# Patient Record
Sex: FEMALE | Race: BLACK OR AFRICAN AMERICAN | NOT HISPANIC OR LATINO | Employment: UNEMPLOYED | URBAN - METROPOLITAN AREA
[De-identification: names, ages, dates, MRNs, and addresses within clinical notes are randomized per-mention and may not be internally consistent; named-entity substitution may affect disease eponyms.]

---

## 2022-03-16 ENCOUNTER — OFFICE VISIT (OUTPATIENT)
Dept: PRIMARY CARE CLINIC | Facility: CLINIC | Age: 32
End: 2022-03-16
Payer: MEDICAID

## 2022-03-16 ENCOUNTER — LAB VISIT (OUTPATIENT)
Dept: PRIMARY CARE CLINIC | Facility: CLINIC | Age: 32
End: 2022-03-16
Payer: MEDICAID

## 2022-03-16 VITALS
BODY MASS INDEX: 26.99 KG/M2 | WEIGHT: 152.31 LBS | HEIGHT: 63 IN | OXYGEN SATURATION: 99 % | SYSTOLIC BLOOD PRESSURE: 123 MMHG | TEMPERATURE: 98 F | DIASTOLIC BLOOD PRESSURE: 83 MMHG | HEART RATE: 83 BPM

## 2022-03-16 DIAGNOSIS — N39.3 STRESS INCONTINENCE: ICD-10-CM

## 2022-03-16 DIAGNOSIS — R06.09 DYSPNEA ON EXERTION: ICD-10-CM

## 2022-03-16 DIAGNOSIS — Z00.00 ANNUAL PHYSICAL EXAM: Primary | ICD-10-CM

## 2022-03-16 DIAGNOSIS — R05.9 COUGH: ICD-10-CM

## 2022-03-16 DIAGNOSIS — R09.82 POST-NASAL DRIP: ICD-10-CM

## 2022-03-16 DIAGNOSIS — Z00.00 ANNUAL PHYSICAL EXAM: ICD-10-CM

## 2022-03-16 DIAGNOSIS — F41.1 GAD (GENERALIZED ANXIETY DISORDER): ICD-10-CM

## 2022-03-16 LAB
25(OH)D3+25(OH)D2 SERPL-MCNC: 41 NG/ML (ref 30–96)
ALBUMIN SERPL BCP-MCNC: 3.8 G/DL (ref 3.5–5.2)
ALP SERPL-CCNC: 65 U/L (ref 55–135)
ALT SERPL W/O P-5'-P-CCNC: 15 U/L (ref 10–44)
ANION GAP SERPL CALC-SCNC: 8 MMOL/L (ref 8–16)
AST SERPL-CCNC: 19 U/L (ref 10–40)
BASOPHILS # BLD AUTO: 0.06 K/UL (ref 0–0.2)
BASOPHILS NFR BLD: 1 % (ref 0–1.9)
BILIRUB SERPL-MCNC: 0.4 MG/DL (ref 0.1–1)
BUN SERPL-MCNC: 10 MG/DL (ref 6–20)
CALCIUM SERPL-MCNC: 9.8 MG/DL (ref 8.7–10.5)
CHLORIDE SERPL-SCNC: 107 MMOL/L (ref 95–110)
CO2 SERPL-SCNC: 25 MMOL/L (ref 23–29)
CREAT SERPL-MCNC: 0.8 MG/DL (ref 0.5–1.4)
DIFFERENTIAL METHOD: ABNORMAL
EOSINOPHIL # BLD AUTO: 0.2 K/UL (ref 0–0.5)
EOSINOPHIL NFR BLD: 2.6 % (ref 0–8)
ERYTHROCYTE [DISTWIDTH] IN BLOOD BY AUTOMATED COUNT: 14.1 % (ref 11.5–14.5)
EST. GFR  (AFRICAN AMERICAN): >60 ML/MIN/1.73 M^2
EST. GFR  (NON AFRICAN AMERICAN): >60 ML/MIN/1.73 M^2
GLUCOSE SERPL-MCNC: 97 MG/DL (ref 70–110)
HCT VFR BLD AUTO: 38.1 % (ref 37–48.5)
HGB BLD-MCNC: 12.2 G/DL (ref 12–16)
IMM GRANULOCYTES # BLD AUTO: 0.01 K/UL (ref 0–0.04)
IMM GRANULOCYTES NFR BLD AUTO: 0.2 % (ref 0–0.5)
LYMPHOCYTES # BLD AUTO: 3.1 K/UL (ref 1–4.8)
LYMPHOCYTES NFR BLD: 49.5 % (ref 18–48)
MCH RBC QN AUTO: 28.8 PG (ref 27–31)
MCHC RBC AUTO-ENTMCNC: 32 G/DL (ref 32–36)
MCV RBC AUTO: 90 FL (ref 82–98)
MONOCYTES # BLD AUTO: 0.3 K/UL (ref 0.3–1)
MONOCYTES NFR BLD: 5.4 % (ref 4–15)
NEUTROPHILS # BLD AUTO: 2.6 K/UL (ref 1.8–7.7)
NEUTROPHILS NFR BLD: 41.3 % (ref 38–73)
NRBC BLD-RTO: 0 /100 WBC
PLATELET # BLD AUTO: 283 K/UL (ref 150–450)
PMV BLD AUTO: 11.2 FL (ref 9.2–12.9)
POTASSIUM SERPL-SCNC: 4.5 MMOL/L (ref 3.5–5.1)
PROT SERPL-MCNC: 8.1 G/DL (ref 6–8.4)
RBC # BLD AUTO: 4.24 M/UL (ref 4–5.4)
SODIUM SERPL-SCNC: 140 MMOL/L (ref 136–145)
TSH SERPL DL<=0.005 MIU/L-ACNC: 1.62 UIU/ML (ref 0.4–4)
WBC # BLD AUTO: 6.24 K/UL (ref 3.9–12.7)

## 2022-03-16 PROCEDURE — 99204 PR OFFICE/OUTPT VISIT, NEW, LEVL IV, 45-59 MIN: ICD-10-PCS | Mod: S$PBB,,, | Performed by: NURSE PRACTITIONER

## 2022-03-16 PROCEDURE — 84443 ASSAY THYROID STIM HORMONE: CPT | Performed by: NURSE PRACTITIONER

## 2022-03-16 PROCEDURE — 99204 OFFICE O/P NEW MOD 45 MIN: CPT | Mod: S$PBB,,, | Performed by: NURSE PRACTITIONER

## 2022-03-16 PROCEDURE — 1159F MED LIST DOCD IN RCRD: CPT | Mod: CPTII,,, | Performed by: NURSE PRACTITIONER

## 2022-03-16 PROCEDURE — 99999 PR PBB SHADOW E&M-EST. PATIENT-LVL V: ICD-10-PCS | Mod: PBBFAC,,, | Performed by: NURSE PRACTITIONER

## 2022-03-16 PROCEDURE — 3074F SYST BP LT 130 MM HG: CPT | Mod: CPTII,,, | Performed by: NURSE PRACTITIONER

## 2022-03-16 PROCEDURE — 36415 COLL VENOUS BLD VENIPUNCTURE: CPT | Mod: PBBFAC,PN

## 2022-03-16 PROCEDURE — 82306 VITAMIN D 25 HYDROXY: CPT | Performed by: NURSE PRACTITIONER

## 2022-03-16 PROCEDURE — 80053 COMPREHEN METABOLIC PANEL: CPT | Performed by: NURSE PRACTITIONER

## 2022-03-16 PROCEDURE — 3008F BODY MASS INDEX DOCD: CPT | Mod: CPTII,,, | Performed by: NURSE PRACTITIONER

## 2022-03-16 PROCEDURE — 1159F PR MEDICATION LIST DOCUMENTED IN MEDICAL RECORD: ICD-10-PCS | Mod: CPTII,,, | Performed by: NURSE PRACTITIONER

## 2022-03-16 PROCEDURE — 3079F PR MOST RECENT DIASTOLIC BLOOD PRESSURE 80-89 MM HG: ICD-10-PCS | Mod: CPTII,,, | Performed by: NURSE PRACTITIONER

## 2022-03-16 PROCEDURE — 85025 COMPLETE CBC W/AUTO DIFF WBC: CPT | Performed by: NURSE PRACTITIONER

## 2022-03-16 PROCEDURE — 3008F PR BODY MASS INDEX (BMI) DOCUMENTED: ICD-10-PCS | Mod: CPTII,,, | Performed by: NURSE PRACTITIONER

## 2022-03-16 PROCEDURE — 36415 COLL VENOUS BLD VENIPUNCTURE: CPT | Performed by: NURSE PRACTITIONER

## 2022-03-16 PROCEDURE — 99215 OFFICE O/P EST HI 40 MIN: CPT | Mod: PBBFAC,PN | Performed by: NURSE PRACTITIONER

## 2022-03-16 PROCEDURE — 99999 PR PBB SHADOW E&M-EST. PATIENT-LVL V: CPT | Mod: PBBFAC,,, | Performed by: NURSE PRACTITIONER

## 2022-03-16 PROCEDURE — 3079F DIAST BP 80-89 MM HG: CPT | Mod: CPTII,,, | Performed by: NURSE PRACTITIONER

## 2022-03-16 PROCEDURE — 3074F PR MOST RECENT SYSTOLIC BLOOD PRESSURE < 130 MM HG: ICD-10-PCS | Mod: CPTII,,, | Performed by: NURSE PRACTITIONER

## 2022-03-16 RX ORDER — PHENTERMINE HYDROCHLORIDE 37.5 MG/1
37.5 TABLET ORAL DAILY
COMMUNITY
Start: 2022-02-02 | End: 2022-03-14

## 2022-03-16 RX ORDER — LEVOCETIRIZINE DIHYDROCHLORIDE 5 MG/1
5 TABLET, FILM COATED ORAL NIGHTLY
COMMUNITY
Start: 2021-11-26 | End: 2022-03-16 | Stop reason: SDUPTHER

## 2022-03-16 RX ORDER — LEVOCETIRIZINE DIHYDROCHLORIDE 5 MG/1
5 TABLET, FILM COATED ORAL NIGHTLY
Qty: 30 TABLET | Refills: 3 | Status: SHIPPED | OUTPATIENT
Start: 2022-03-16 | End: 2023-05-11 | Stop reason: SDUPTHER

## 2022-03-16 RX ORDER — FLUTICASONE PROPIONATE 50 MCG
1 SPRAY, SUSPENSION (ML) NASAL DAILY
Qty: 18.2 ML | Refills: 0 | Status: SHIPPED | OUTPATIENT
Start: 2022-03-16 | End: 2023-05-11 | Stop reason: SDUPTHER

## 2022-03-16 RX ORDER — ETONOGESTREL AND ETHINYL ESTRADIOL VAGINAL RING .015; .12 MG/D; MG/D
RING VAGINAL
COMMUNITY
Start: 2022-03-08

## 2022-03-16 RX ORDER — BENZONATATE 100 MG/1
100 CAPSULE ORAL 3 TIMES DAILY PRN
Qty: 20 CAPSULE | Refills: 0 | Status: SHIPPED | OUTPATIENT
Start: 2022-03-16 | End: 2022-03-26

## 2022-03-16 RX ORDER — SERTRALINE HYDROCHLORIDE 50 MG/1
50 TABLET, FILM COATED ORAL DAILY
COMMUNITY
Start: 2022-02-14 | End: 2023-05-11 | Stop reason: SDUPTHER

## 2022-03-16 NOTE — PROGRESS NOTES
Subjective:       Patient ID: Betina Jay is a 31 y.o. female.    Chief Complaint: Annual Exam and Cough     HPI     Ms. Jay is a 31 year old female patient that presents to clinic for annual exam and chief complaint of cough.  Past medical history of HPV infection and anxiety.  Past surgical history of  section and cervical biopsy with loop electrode excision.  She has no PCP, she is new to me.  Positive for persistent dry, nonproductive cough for approximately 1 year.  History of marijuana use and bronchitis.  Reports shortness of breath with exertion.  Treatments tried:  Albuterol inhaler with minimal relief in symptoms.  Reports albuterol caused her or lightheadedness.  Denies fever, chills, night sweats, chest pain.    Reports stress incontinence.  History of 2 births--one vaginal delivery and one  section.   Stress incontinence--kegels, 2 births one vaginal / one  section , +sneezing, laughing, coughing.     Diet--no fast foods. Eats primarily oatmeal, egg whites, salmon, and salad.  Limits alcohol, drinks primarily water, occasional coffee.  Currently taking Adipex prescribed by Dr. Peterson.     Exercise- exercises three times weekly--includes weight training, cardiovascular, and calisthenics. Workout is usually one hour at a time.     Dental exam-- bi annual cleanings up to date. Last exam 22.    Eye exam-- rx glasses and contacts-- eye exam up to date.  Last exam 2021. [id number].    Mental health- she is followed by Dr. Bates for anxiety and depression.     Immunizations-Tetanus 2020; COVID-19: completed series and booster;last dose 2022. Influenza received through nursing school 2021.  All other immunizations up to date.     Cancer screenings--Cervical cancer screening due.     LMP 3/2/22--nuva ring.    ROS as listed.   Past Medical History:   Diagnosis Date    Anxiety     History of HPV infection       Past Surgical History:   Procedure Laterality  "Date    CERVICAL BIOPSY  W/ LOOP ELECTRODE EXCISION       SECTION        Family History   Problem Relation Age of Onset    Hypertension Mother     Hyperlipidemia Father     Heart disease Paternal Grandmother     Cancer Neg Hx     Stroke Neg Hx       Review of patient's allergies indicates:   Allergen Reactions    Penicillins Hives     Review of Systems   Respiratory: Positive for cough (non productive, dry-worse at night) and shortness of breath (w/ exertion ).         Labored breathing when lying down    Psychiatric/Behavioral: Positive for sleep disturbance.       Objective:       Vitals:    22 1135   BP: 123/83   BP Location: Left arm   Patient Position: Sitting   BP Method: Medium (Automatic)   Pulse: 83   Temp: 98.4 °F (36.9 °C)   TempSrc: Oral   SpO2: 99%   Weight: 69.1 kg (152 lb 5.4 oz)   Height: 5' 3" (1.6 m)     Physical Exam  Vitals and nursing note reviewed.   Constitutional:       General: She is not in acute distress.     Appearance: Normal appearance. She is normal weight. She is not toxic-appearing.   HENT:      Head: Normocephalic and atraumatic.      Nose: Rhinorrhea present.      Mouth/Throat:      Mouth: Mucous membranes are moist.      Pharynx: Posterior oropharyngeal erythema present. No oropharyngeal exudate.      Comments: Mask in place  Eyes:      Conjunctiva/sclera: Conjunctivae normal.      Pupils: Pupils are equal, round, and reactive to light.   Cardiovascular:      Rate and Rhythm: Normal rate and regular rhythm.      Heart sounds: Normal heart sounds. No murmur heard.    No gallop.   Pulmonary:      Effort: Pulmonary effort is normal. No respiratory distress.      Breath sounds: Normal breath sounds.   Abdominal:      General: Bowel sounds are normal. There is no distension.      Palpations: Abdomen is soft.   Musculoskeletal:         General: Normal range of motion.      Cervical back: Normal range of motion.   Lymphadenopathy:      Cervical: No cervical " adenopathy.   Skin:     General: Skin is warm and dry.      Capillary Refill: Capillary refill takes less than 2 seconds.   Neurological:      Mental Status: She is alert and oriented to person, place, and time.      Gait: Gait normal.   Psychiatric:         Mood and Affect: Mood normal.         Behavior: Behavior normal.         Lab Results   Component Value Date    WBC 6.24 03/16/2022    HGB 12.2 03/16/2022    HCT 38.1 03/16/2022     03/16/2022    ALT 15 03/16/2022    AST 19 03/16/2022     03/16/2022    K 4.5 03/16/2022     03/16/2022    CREATININE 0.8 03/16/2022    BUN 10 03/16/2022    CO2 25 03/16/2022    TSH 1.624 03/16/2022      Assessment:       1. Annual physical exam    2. Post-nasal drip    3. Stress incontinence    4. BRIDGET (generalized anxiety disorder)    5. Dyspnea on exertion    6. Cough        Plan:       Annual physical exam  -     CBC Auto Differential; Future; Expected date: 03/16/2022  -     Comprehensive Metabolic Panel; Future; Expected date: 03/16/2022  -     Vitamin D; Future; Expected date: 03/16/2022  -     TSH; Future; Expected date: 03/16/2022    Post-nasal drip  -     fluticasone propionate (FLONASE) 50 mcg/actuation nasal spray; 1 spray (50 mcg total) by Each Nostril route once daily.  Dispense: 18.2 mL; Refill: 0  -     levocetirizine (XYZAL) 5 MG tablet; Take 1 tablet (5 mg total) by mouth every evening.  Dispense: 30 tablet; Refill: 3    Stress incontinence  -     Ambulatory referral/consult to Urogynecology; Future; Expected date: 03/23/2022  -     Ambulatory referral/consult to Physical/Occupational Therapy; Future; Expected date: 03/16/2022    BRIDGET (generalized anxiety disorder)    Dyspnea on exertion  -     X-Ray Chest PA And Lateral; Future; Expected date: 03/16/2022    Cough  -     benzonatate (TESSALON) 100 MG capsule; Take 1 capsule (100 mg total) by mouth 3 (three) times daily as needed for Cough.  Dispense: 20 capsule; Refill: 0      Medication List with  Changes/Refills   New Medications    BENZONATATE (TESSALON) 100 MG CAPSULE    Take 1 capsule (100 mg total) by mouth 3 (three) times daily as needed for Cough.    FLUTICASONE PROPIONATE (FLONASE) 50 MCG/ACTUATION NASAL SPRAY    1 spray (50 mcg total) by Each Nostril route once daily.   Current Medications    ETONOGESTREL-ETHINYL ESTRADIOL (NUVARING) 0.12-0.015 MG/24 HR VAGINAL RING    Place vaginally.    PHENTERMINE (ADIPEX-P) 37.5 MG TABLET    Take 37.5 mg by mouth once daily.    SERTRALINE (ZOLOFT) 50 MG TABLET    Take 50 mg by mouth once daily.   Changed and/or Refilled Medications    Modified Medication Previous Medication    LEVOCETIRIZINE (XYZAL) 5 MG TABLET levocetirizine (XYZAL) 5 MG tablet       Take 1 tablet (5 mg total) by mouth every evening.    Take 5 mg by mouth nightly.       Follow up in about 4 weeks (around 4/13/2022), or establish care, for with Dr. Sousa.     Samira Sahu, DNP, APRN, FNP-C

## 2022-03-29 PROBLEM — M62.81 MUSCLE WEAKNESS: Status: ACTIVE | Noted: 2022-03-29

## 2022-03-29 PROBLEM — R27.9 LACK OF COORDINATION: Status: ACTIVE | Noted: 2022-03-29

## 2022-04-14 ENCOUNTER — OFFICE VISIT (OUTPATIENT)
Dept: PRIMARY CARE CLINIC | Facility: CLINIC | Age: 32
End: 2022-04-14
Payer: MEDICAID

## 2022-04-14 VITALS
BODY MASS INDEX: 26.86 KG/M2 | HEART RATE: 77 BPM | DIASTOLIC BLOOD PRESSURE: 70 MMHG | TEMPERATURE: 98 F | SYSTOLIC BLOOD PRESSURE: 112 MMHG | WEIGHT: 151.56 LBS | RESPIRATION RATE: 18 BRPM | HEIGHT: 63 IN | OXYGEN SATURATION: 99 %

## 2022-04-14 DIAGNOSIS — R05.3 CHRONIC COUGH: Primary | ICD-10-CM

## 2022-04-14 DIAGNOSIS — Z11.59 NEED FOR HEPATITIS C SCREENING TEST: ICD-10-CM

## 2022-04-14 DIAGNOSIS — L21.9 SEBORRHEIC DERMATITIS: ICD-10-CM

## 2022-04-14 DIAGNOSIS — J30.89 NON-SEASONAL ALLERGIC RHINITIS DUE TO OTHER ALLERGIC TRIGGER: ICD-10-CM

## 2022-04-14 DIAGNOSIS — N39.3 SUI (STRESS URINARY INCONTINENCE, FEMALE): ICD-10-CM

## 2022-04-14 PROCEDURE — 99999 PR PBB SHADOW E&M-EST. PATIENT-LVL III: ICD-10-PCS | Mod: PBBFAC,,, | Performed by: FAMILY MEDICINE

## 2022-04-14 PROCEDURE — 99214 OFFICE O/P EST MOD 30 MIN: CPT | Mod: S$PBB,,, | Performed by: FAMILY MEDICINE

## 2022-04-14 PROCEDURE — 3074F PR MOST RECENT SYSTOLIC BLOOD PRESSURE < 130 MM HG: ICD-10-PCS | Mod: CPTII,,, | Performed by: FAMILY MEDICINE

## 2022-04-14 PROCEDURE — 99999 PR PBB SHADOW E&M-EST. PATIENT-LVL III: CPT | Mod: PBBFAC,,, | Performed by: FAMILY MEDICINE

## 2022-04-14 PROCEDURE — 3008F PR BODY MASS INDEX (BMI) DOCUMENTED: ICD-10-PCS | Mod: CPTII,,, | Performed by: FAMILY MEDICINE

## 2022-04-14 PROCEDURE — 3078F DIAST BP <80 MM HG: CPT | Mod: CPTII,,, | Performed by: FAMILY MEDICINE

## 2022-04-14 PROCEDURE — 99214 PR OFFICE/OUTPT VISIT, EST, LEVL IV, 30-39 MIN: ICD-10-PCS | Mod: S$PBB,,, | Performed by: FAMILY MEDICINE

## 2022-04-14 PROCEDURE — 3078F PR MOST RECENT DIASTOLIC BLOOD PRESSURE < 80 MM HG: ICD-10-PCS | Mod: CPTII,,, | Performed by: FAMILY MEDICINE

## 2022-04-14 PROCEDURE — 3008F BODY MASS INDEX DOCD: CPT | Mod: CPTII,,, | Performed by: FAMILY MEDICINE

## 2022-04-14 PROCEDURE — 99213 OFFICE O/P EST LOW 20 MIN: CPT | Mod: PBBFAC,PN | Performed by: FAMILY MEDICINE

## 2022-04-14 PROCEDURE — 3074F SYST BP LT 130 MM HG: CPT | Mod: CPTII,,, | Performed by: FAMILY MEDICINE

## 2022-04-14 RX ORDER — OXYMETAZOLINE HCL 0.05 %
2 SPRAY, NON-AEROSOL (ML) NASAL 2 TIMES DAILY
Qty: 15 ML | Refills: 0 | Status: SHIPPED | OUTPATIENT
Start: 2022-04-14 | End: 2022-04-17

## 2022-04-14 RX ORDER — KETOCONAZOLE 20 MG/ML
SHAMPOO, SUSPENSION TOPICAL
Qty: 120 ML | Refills: 2 | Status: SHIPPED | OUTPATIENT
Start: 2022-04-14

## 2022-04-14 NOTE — PROGRESS NOTES
"Subjective:       Patient ID: Betina Jay is a 31 y.o. female.    Chief Complaint: Establish Care (Prior visitation for stress incontinence, was referred to a specialist. Various concerns and nonspecific. C/o consistent cough, allergies. Inquire regarding labs and results review; c/o dermatitis. )    30 yo F pt, new to me, with PMH significant for anxiety and h/o HPV and LEEP in . She presents to [facility]. She saw NP Samira Sahu for an annual exam 3/16/2022. She has several complaints today.     1. Cough x 1 year: Cough is dry; stimulated from sensation of post-nasal.No associated wheezing. + shortness of breath with walking/taking stairs. Often has "coughing spells" at night. --Advised this can be allergies and started on Flonase + Xyzal. Symptoms have improved with flonase; taking xyzal on prn basis. Has family history of atopic dermatitis. Sister has asthma. She had a normal CXR 3/16/2022. States she had spirometry done in early 20s--didn't understand results, but believes she was rx'd albuterol. No reflux symptoms.     2.Stress incontinence: Reports leakage of urine with cough/laugh/sneezing x few years. Reports accidental release of streams of urine. She had - and  . Symptoms became more prominent after delivery of 3 year of son.  + h/o constipation--started taking metamucil 2 months ago with improvement. Drinks 0.5-1 cup of coffee daily. Denies consumption of sugary drinks. No heavy lifting.    3.Has h/o seborrheic dermatitis: Requesting rx'd shampoo for flaking around hairline. Sometimes itchy. Flares after washing hair. Often with rash to nasolabial folds, mouth, and forehead      Past Medical History:   Diagnosis Date    Anxiety     History of HPV infection     Other depression        Patient Active Problem List   Diagnosis    Muscle weakness    Lack of coordination    LUNA (stress urinary incontinence, female)    Seborrheic dermatitis    Non-seasonal " "allergic rhinitis    Chronic cough       Past Surgical History:   Procedure Laterality Date    CERVICAL BIOPSY  W/ LOOP ELECTRODE EXCISION       SECTION         Family History   Problem Relation Age of Onset    Hypertension Mother     Hyperlipidemia Father     Heart disease Paternal Grandmother     Cancer Neg Hx     Stroke Neg Hx        Social History     Tobacco Use   Smoking Status Never Smoker   Smokeless Tobacco Never Used       Medications have been reviewed and reconciled.     Review of patient's allergies indicates:   Allergen Reactions    Penicillins Hives        Review of Systems   Constitutional: Negative for activity change, appetite change, chills, fatigue, fever and unexpected weight change.   HENT: Negative for congestion, sinus pressure, sinus pain, sneezing, sore throat and trouble swallowing.    Eyes: Negative for redness, itching and visual disturbance.   Respiratory: Positive for cough and shortness of breath. Negative for chest tightness and wheezing.    Cardiovascular: Negative for chest pain, palpitations and leg swelling.   Gastrointestinal: Negative for abdominal pain, constipation, diarrhea, nausea and vomiting.   Genitourinary: Negative for dysuria, frequency, hematuria, pelvic pain, urgency, vaginal bleeding and vaginal discharge.   Musculoskeletal: Negative for arthralgias.   Skin: Positive for rash.   Neurological: Negative for dizziness, syncope and headaches.   Psychiatric/Behavioral: Negative for dysphoric mood and suicidal ideas. The patient is not nervous/anxious.          Objective:        /70 (BP Location: Right arm, Patient Position: Sitting, BP Method: Small (Automatic))   Pulse 77   Temp 98.4 °F (36.9 °C) (Oral)   Resp 18   Ht 5' 3" (1.6 m)   Wt 68.8 kg (151 lb 9.1 oz)   SpO2 99%   BMI 26.85 kg/m²      Physical Exam  Constitutional:       General: She is not in acute distress.     Appearance: She is well-developed.   HENT:      Head: Normocephalic " and atraumatic.      Right Ear: Tympanic membrane, ear canal and external ear normal.      Left Ear: Tympanic membrane, ear canal and external ear normal.      Mouth/Throat:      Pharynx: No oropharyngeal exudate or posterior oropharyngeal erythema.      Comments: + minimal cobblestoning  Eyes:      General: No scleral icterus.     Extraocular Movements: Extraocular movements intact.      Conjunctiva/sclera: Conjunctivae normal.   Neck:      Thyroid: No thyromegaly.   Cardiovascular:      Rate and Rhythm: Normal rate and regular rhythm.      Heart sounds: Normal heart sounds. No murmur heard.    No friction rub. No gallop.   Pulmonary:      Effort: Pulmonary effort is normal.      Breath sounds: Normal breath sounds. No wheezing or rales.   Abdominal:      General: Abdomen is flat. There is no distension.      Palpations: Abdomen is soft. There is no mass.      Tenderness: There is no abdominal tenderness.   Musculoskeletal:         General: Normal range of motion.      Cervical back: Normal range of motion and neck supple.      Right lower leg: No edema.      Left lower leg: No edema.   Lymphadenopathy:      Cervical: No cervical adenopathy.   Skin:     General: Skin is warm and dry.      Findings: No erythema or rash.      Comments: + mild erythema and scale to anterior hairline   Neurological:      Mental Status: She is alert and oriented to person, place, and time.      Cranial Nerves: No cranial nerve deficit.   Psychiatric:         Mood and Affect: Mood normal.         Behavior: Behavior normal.           Assessment:       1. Chronic cough    2. Non-seasonal allergic rhinitis due to other allergic trigger    3. LUNA (stress urinary incontinence, female)    4. Seborrheic dermatitis        Plan:       Betina was seen today for establish care.    Diagnoses and all orders for this visit:    Chronic cough  -     Complete PFT w/ bronchodilator; Future    Non-seasonal allergic rhinitis due to other allergic trigger  -      oxymetazoline (AFRIN, OXYMETAZOLINE,) 0.05 % nasal spray; 2 sprays by Nasal route 2 (two) times daily. for 3 days    LUNA (stress urinary incontinence, female)  -     Ambulatory referral/consult to Physical/Occupational Therapy; Future    Seborrheic dermatitis  -     ketoconazole (NIZORAL) 2 % shampoo; Apply topically twice a week.      Cough   --Dry cough present x 1 year.   --Mild improvement with Flonase/Xyzal   --I am concerned for reactive airway disease  --Send for PFTs  --Consider singulair and/or albuterol pending results     AR  --Continue Flonase daily  --Advised Xyzal hs   --Afrin x 3 days given turbinate hypertrophy on exam.    LUNA   --Anticipate appt with Urogyn 05/13/2022  --Advised avoidance of constipation; limit sugary and caffeinated beverages; routine physical activity and maintenance of healthy weight encouraged   --Likely will benefit from pelvic floor therapy. Will place referral     Seb Derm   --Scalp mainly affected   --Start Ketoconazole 2% shampoo twice weekly x 6-8 weeks    Anxiety/Depression   Following with Dr. Bates for anxiety and depression.   Continue Zoloft 25 mgd aily       Has upcoming pap appt with [facility]   Needs LP, Hep C, HIV screens with next labs   Considering HPV vaccine if covered by insurance    F/U plan pending PFTs    I spent a total of 40 minutes on the day of the visit.This includes face to face time and non-face to face time preparing to see the patient (eg, review of tests), obtaining and/or reviewing separately obtained history, documenting clinical information in the electronic or other health record, independently interpreting results and communicating results to the patient/family/caregiver, or care coordinator.

## 2022-04-22 ENCOUNTER — HOSPITAL ENCOUNTER (OUTPATIENT)
Dept: PULMONOLOGY | Facility: CLINIC | Age: 32
Discharge: HOME | End: 2022-04-22
Payer: MEDICAID

## 2022-04-22 DIAGNOSIS — R05.3 CHRONIC COUGH: ICD-10-CM

## 2022-04-22 LAB
DLCO ADJ PRE: 26.25 ML/(MIN*MMHG) (ref 20.64–32.1)
DLCO SINGLE BREATH LLN: 20.64
DLCO SINGLE BREATH PRE REF: 95.7 %
DLCO SINGLE BREATH REF: 26.37
DLCOC SBVA LLN: 3.87
DLCOC SBVA PRE REF: 110.6 %
DLCOC SBVA REF: 5.53
DLCOC SINGLE BREATH LLN: 20.64
DLCOC SINGLE BREATH PRE REF: 99.6 %
DLCOC SINGLE BREATH REF: 26.37
DLCOCSBVAULN: 7.19
DLCOCSINGLEBREATHULN: 32.1
DLCOSINGLEBREATHULN: 32.1
DLCOVA LLN: 3.87
DLCOVA PRE REF: 106.3 %
DLCOVA PRE: 5.88 ML/(MIN*MMHG*L) (ref 3.87–7.19)
DLCOVA REF: 5.53
DLCOVAULN: 7.19
DLVAADJ PRE: 6.11 ML/(MIN*MMHG*L) (ref 3.87–7.19)
ERV LLN: -16448.78
ERV PRE REF: 88.5 %
ERV REF: 1.22
ERVULN: ABNORMAL
FEF 25 75 LLN: 1.75
FEF 25 75 PRE REF: 98.6 %
FEF 25 75 REF: 3.04
FET100 CHG: 5.6 %
FEV05 LLN: 1.34
FEV05 REF: 2.2
FEV1 CHG: 3.5 %
FEV1 FVC LLN: 74
FEV1 FVC PRE REF: 97.6 %
FEV1 FVC REF: 85
FEV1 LLN: 2.08
FEV1 PRE REF: 104.3 %
FEV1 REF: 2.66
FEV1 VOL CHG: 0.1
FRCPLETH LLN: 1.79
FRCPLETH PREREF: 75.7 %
FRCPLETH REF: 2.62
FRCPLETHULN: 3.44
FVC CHG: -1.1 %
FVC LLN: 2.45
FVC PRE REF: 106.5 %
FVC REF: 3.13
FVC VOL CHG: -0.04
IVC PRE: 3.35 L (ref 2.45–3.84)
IVC SINGLE BREATH LLN: 2.45
IVC SINGLE BREATH PRE REF: 107.1 %
IVC SINGLE BREATH REF: 3.13
IVCSINGLEBREATHULN: 3.84
LLN IC: -16447.79
PEF LLN: 4.73
PEF PRE REF: 100.4 %
PEF REF: 6.67
PHYSICIAN COMMENT: ABNORMAL
POST FEF 25 75: 3.65 L/S (ref 1.75–4.61)
POST FET 100: 6.75 SEC
POST FEV1 FVC: 87.09 % (ref 74.05–94.12)
POST FEV1: 2.87 L (ref 2.08–3.22)
POST FEV5: 2.32 L (ref 1.34–3.06)
POST FVC: 3.3 L (ref 2.45–3.84)
POST PEF: 6.6 L/S (ref 4.73–8.62)
PRE DLCO: 25.23 ML/(MIN*MMHG) (ref 20.64–32.1)
PRE ERV: 1.08 L (ref -16448.78–16451.22)
PRE FEF 25 75: 3 L/S (ref 1.75–4.61)
PRE FET 100: 6.39 SEC
PRE FEV05 REF: 99.8 %
PRE FEV1 FVC: 83.2 % (ref 74.05–94.12)
PRE FEV1: 2.78 L (ref 2.08–3.22)
PRE FEV5: 2.2 L (ref 1.34–3.06)
PRE FRC PL: 1.98 L (ref 1.79–3.44)
PRE FVC: 3.34 L (ref 2.45–3.84)
PRE IC: 2.27 L (ref -16447.79–16452.21)
PRE PEF: 6.7 L/S (ref 4.73–8.62)
PRE REF IC: 102.8 %
PRE RV: 0.9 L (ref 0.82–1.97)
PRE TLC: 4.25 L (ref 3.78–5.76)
RAW PRE REF: 151.6 %
RAW PRE: 4.64 CMH2O*S/L (ref 3.06–3.06)
RAW REF: 3.06
REF IC: 2.21
RV LLN: 0.82
RV PRE REF: 64.6 %
RV REF: 1.39
RVTLC LLN: 20
RVTLC PRE REF: 71.7 %
RVTLC PRE: 21.14 % (ref 19.91–39.09)
RVTLC REF: 30
RVTLCULN: 39
RVULN: 1.97
SGAW PRE REF: 86.2 %
SGAW PRE: 0.09 1/(CMH2O*S) (ref 0.1–0.1)
SGAW REF: 0.1
TLC LLN: 3.78
TLC PRE REF: 89.1 %
TLC REF: 4.77
TLC ULN: 5.76
ULN IC: ABNORMAL
VA PRE: 4.29 L (ref 4.62–4.62)
VA SINGLE BREATH PRE REF: 92.9 %
VA SINGLE BREATH REF: 4.62
VC LLN: 2.45
VC PRE REF: 107.1 %
VC PRE: 3.35 L (ref 2.45–3.84)
VC REF: 3.13
VC ULN: 3.84

## 2022-04-22 PROCEDURE — 94060 EVALUATION OF WHEEZING: CPT | Mod: PBBFAC | Performed by: INTERNAL MEDICINE

## 2022-04-22 PROCEDURE — 94729 DIFFUSING CAPACITY: CPT | Mod: PBBFAC | Performed by: INTERNAL MEDICINE

## 2022-04-22 PROCEDURE — 94060 PR EVAL OF BRONCHOSPASM: ICD-10-PCS | Mod: 26,S$PBB,, | Performed by: INTERNAL MEDICINE

## 2022-04-22 PROCEDURE — 94726 PLETHYSMOGRAPHY LUNG VOLUMES: CPT | Mod: 26,S$PBB,, | Performed by: INTERNAL MEDICINE

## 2022-04-22 PROCEDURE — 94729 DIFFUSING CAPACITY: CPT | Mod: 26,S$PBB,, | Performed by: INTERNAL MEDICINE

## 2022-04-22 PROCEDURE — 94729 PR C02/MEMBANE DIFFUSE CAPACITY: ICD-10-PCS | Mod: 26,S$PBB,, | Performed by: INTERNAL MEDICINE

## 2022-04-22 PROCEDURE — 94726 PULM FUNCT TST PLETHYSMOGRAP: ICD-10-PCS | Mod: 26,S$PBB,, | Performed by: INTERNAL MEDICINE

## 2022-04-22 PROCEDURE — 94726 PLETHYSMOGRAPHY LUNG VOLUMES: CPT | Mod: PBBFAC | Performed by: INTERNAL MEDICINE

## 2022-04-22 PROCEDURE — 94060 EVALUATION OF WHEEZING: CPT | Mod: 26,S$PBB,, | Performed by: INTERNAL MEDICINE

## 2022-04-24 ENCOUNTER — PATIENT MESSAGE (OUTPATIENT)
Dept: PRIMARY CARE CLINIC | Facility: CLINIC | Age: 32
End: 2022-04-24
Payer: MEDICAID

## 2022-04-24 DIAGNOSIS — R05.3 CHRONIC COUGH: Primary | ICD-10-CM

## 2022-04-24 RX ORDER — ALBUTEROL SULFATE 90 UG/1
2 AEROSOL, METERED RESPIRATORY (INHALATION) EVERY 6 HOURS PRN
Qty: 18 G | Refills: 0 | Status: SHIPPED | OUTPATIENT
Start: 2022-04-24 | End: 2022-08-24

## 2022-04-24 RX ORDER — MONTELUKAST SODIUM 10 MG/1
10 TABLET ORAL NIGHTLY
Qty: 30 TABLET | Refills: 1 | Status: SHIPPED | OUTPATIENT
Start: 2022-04-24 | End: 2022-05-24

## 2022-04-25 ENCOUNTER — PATIENT MESSAGE (OUTPATIENT)
Dept: PRIMARY CARE CLINIC | Facility: CLINIC | Age: 32
End: 2022-04-25
Payer: MEDICAID

## 2022-06-07 ENCOUNTER — PATIENT MESSAGE (OUTPATIENT)
Dept: PRIMARY CARE CLINIC | Facility: CLINIC | Age: 32
End: 2022-06-07
Payer: MEDICAID

## 2024-04-25 ENCOUNTER — CLINICAL SUPPORT (OUTPATIENT)
Dept: OBSTETRICS AND GYNECOLOGY | Facility: CLINIC | Age: 34
End: 2024-04-25
Payer: COMMERCIAL

## 2024-04-25 DIAGNOSIS — N91.2 AMENORRHEA: Primary | ICD-10-CM

## 2024-04-30 ENCOUNTER — TELEPHONE (OUTPATIENT)
Dept: OBSTETRICS AND GYNECOLOGY | Facility: CLINIC | Age: 34
End: 2024-04-30
Payer: COMMERCIAL

## 2024-04-30 ENCOUNTER — CLINICAL SUPPORT (OUTPATIENT)
Dept: OBSTETRICS AND GYNECOLOGY | Facility: CLINIC | Age: 34
End: 2024-04-30
Payer: COMMERCIAL

## 2024-04-30 ENCOUNTER — PATIENT MESSAGE (OUTPATIENT)
Dept: OBSTETRICS AND GYNECOLOGY | Facility: CLINIC | Age: 34
End: 2024-04-30

## 2024-04-30 DIAGNOSIS — N91.2 AMENORRHEA: Primary | ICD-10-CM

## 2024-04-30 PROCEDURE — 99999 PR PBB SHADOW E&M-EST. PATIENT-LVL I: CPT | Mod: PBBFAC,,,

## 2024-04-30 NOTE — PROGRESS NOTES
Spoke with patient for a total of about 10 minutes during phone call as we were not able to connect for virtual visit. Patient was guided through expectations of care during pregnancy.  Pregnancy confirmation, dating u/s & first routine OB appts were already scheduled during previous appt.  Brief education provided & questions answered. Encouraged to send message or call office with any questions/concerns. Verbalized understanding.     Discussed with pt:    C/o n/v-discussed safe options-reports that she has taken zofran couple of times  Referred to [email or web address] for Preg A to Z guide & class schedule

## 2024-05-21 ENCOUNTER — NURSE TRIAGE (OUTPATIENT)
Dept: ADMINISTRATIVE | Facility: CLINIC | Age: 34
End: 2024-05-21
Payer: COMMERCIAL

## 2024-05-21 ENCOUNTER — TELEPHONE (OUTPATIENT)
Dept: OBSTETRICS AND GYNECOLOGY | Facility: CLINIC | Age: 34
End: 2024-05-21
Payer: COMMERCIAL

## 2024-05-21 ENCOUNTER — HOSPITAL ENCOUNTER (OUTPATIENT)
Facility: OTHER | Age: 34
Discharge: HOME | End: 2024-05-22
Attending: EMERGENCY MEDICINE | Admitting: OBSTETRICS & GYNECOLOGY
Payer: COMMERCIAL

## 2024-05-21 DIAGNOSIS — O36.80X0 PREGNANCY, LOCATION UNKNOWN: ICD-10-CM

## 2024-05-21 DIAGNOSIS — O46.90 VAGINAL BLEEDING IN PREGNANCY: ICD-10-CM

## 2024-05-21 DIAGNOSIS — Z98.890 STATUS POST DILATION AND CURETTAGE: Primary | ICD-10-CM

## 2024-05-21 LAB
ABO + RH BLD: NORMAL
ABO + RH BLD: NORMAL
ALBUMIN SERPL BCP-MCNC: 3.7 G/DL (ref 3.5–5.2)
ALP SERPL-CCNC: 57 U/L (ref 55–135)
ALT SERPL W/O P-5'-P-CCNC: 13 U/L (ref 10–44)
ANION GAP SERPL CALC-SCNC: 8 MMOL/L (ref 8–16)
AST SERPL-CCNC: 14 U/L (ref 10–40)
BACTERIA #/AREA URNS HPF: ABNORMAL /HPF
BASOPHILS # BLD AUTO: 0.03 K/UL (ref 0–0.2)
BASOPHILS # BLD AUTO: 0.04 K/UL (ref 0–0.2)
BASOPHILS NFR BLD: 0.4 % (ref 0–1.9)
BASOPHILS NFR BLD: 0.5 % (ref 0–1.9)
BILIRUB SERPL-MCNC: 0.3 MG/DL (ref 0.1–1)
BILIRUB UR QL STRIP: NEGATIVE
BLD GP AB SCN CELLS X3 SERPL QL: NORMAL
BUN SERPL-MCNC: 13 MG/DL (ref 6–20)
CALCIUM SERPL-MCNC: 9 MG/DL (ref 8.7–10.5)
CHLORIDE SERPL-SCNC: 104 MMOL/L (ref 95–110)
CLARITY UR: CLEAR
CO2 SERPL-SCNC: 24 MMOL/L (ref 23–29)
COLOR UR: ABNORMAL
CREAT SERPL-MCNC: 0.7 MG/DL (ref 0.5–1.4)
DIFFERENTIAL METHOD BLD: ABNORMAL
DIFFERENTIAL METHOD BLD: ABNORMAL
EOSINOPHIL # BLD AUTO: 0.3 K/UL (ref 0–0.5)
EOSINOPHIL # BLD AUTO: 0.3 K/UL (ref 0–0.5)
EOSINOPHIL NFR BLD: 3.1 % (ref 0–8)
EOSINOPHIL NFR BLD: 3.9 % (ref 0–8)
ERYTHROCYTE [DISTWIDTH] IN BLOOD BY AUTOMATED COUNT: 14.5 % (ref 11.5–14.5)
ERYTHROCYTE [DISTWIDTH] IN BLOOD BY AUTOMATED COUNT: 14.6 % (ref 11.5–14.5)
EST. GFR  (NO RACE VARIABLE): >60 ML/MIN/1.73 M^2
GLUCOSE SERPL-MCNC: 100 MG/DL (ref 70–110)
GLUCOSE UR QL STRIP: NEGATIVE
HCG INTACT+B SERPL-ACNC: 3221 MIU/ML
HCT VFR BLD AUTO: 27.9 % (ref 37–48.5)
HCT VFR BLD AUTO: 32.7 % (ref 37–48.5)
HCV AB SERPL QL IA: NEGATIVE
HGB BLD-MCNC: 10.8 G/DL (ref 12–16)
HGB BLD-MCNC: 9.2 G/DL (ref 12–16)
HGB UR QL STRIP: ABNORMAL
HIV 1+2 AB+HIV1 P24 AG SERPL QL IA: NEGATIVE
IMM GRANULOCYTES # BLD AUTO: 0.01 K/UL (ref 0–0.04)
IMM GRANULOCYTES # BLD AUTO: 0.02 K/UL (ref 0–0.04)
IMM GRANULOCYTES NFR BLD AUTO: 0.1 % (ref 0–0.5)
IMM GRANULOCYTES NFR BLD AUTO: 0.2 % (ref 0–0.5)
KETONES UR QL STRIP: NEGATIVE
LEUKOCYTE ESTERASE UR QL STRIP: NEGATIVE
LYMPHOCYTES # BLD AUTO: 3.2 K/UL (ref 1–4.8)
LYMPHOCYTES # BLD AUTO: 3.8 K/UL (ref 1–4.8)
LYMPHOCYTES NFR BLD: 42.7 % (ref 18–48)
LYMPHOCYTES NFR BLD: 46.3 % (ref 18–48)
MCH RBC QN AUTO: 29.1 PG (ref 27–31)
MCH RBC QN AUTO: 29.2 PG (ref 27–31)
MCHC RBC AUTO-ENTMCNC: 33 G/DL (ref 32–36)
MCHC RBC AUTO-ENTMCNC: 33 G/DL (ref 32–36)
MCV RBC AUTO: 88 FL (ref 82–98)
MCV RBC AUTO: 88 FL (ref 82–98)
MICROSCOPIC COMMENT: ABNORMAL
MONOCYTES # BLD AUTO: 0.4 K/UL (ref 0.3–1)
MONOCYTES # BLD AUTO: 0.4 K/UL (ref 0.3–1)
MONOCYTES NFR BLD: 5.3 % (ref 4–15)
MONOCYTES NFR BLD: 5.8 % (ref 4–15)
NEUTROPHILS # BLD AUTO: 3.5 K/UL (ref 1.8–7.7)
NEUTROPHILS # BLD AUTO: 3.6 K/UL (ref 1.8–7.7)
NEUTROPHILS NFR BLD: 44.7 % (ref 38–73)
NEUTROPHILS NFR BLD: 47 % (ref 38–73)
NITRITE UR QL STRIP: NEGATIVE
NRBC BLD-RTO: 0 /100 WBC
NRBC BLD-RTO: 0 /100 WBC
PH UR STRIP: 6 [PH] (ref 5–8)
PLATELET # BLD AUTO: 267 K/UL (ref 150–450)
PLATELET # BLD AUTO: 304 K/UL (ref 150–450)
PMV BLD AUTO: 9.4 FL (ref 9.2–12.9)
PMV BLD AUTO: 9.5 FL (ref 9.2–12.9)
POTASSIUM SERPL-SCNC: 3.8 MMOL/L (ref 3.5–5.1)
PROT SERPL-MCNC: 7.2 G/DL (ref 6–8.4)
PROT UR QL STRIP: ABNORMAL
RBC # BLD AUTO: 3.16 M/UL (ref 4–5.4)
RBC # BLD AUTO: 3.7 M/UL (ref 4–5.4)
RBC #/AREA URNS HPF: >100 /HPF (ref 0–4)
SODIUM SERPL-SCNC: 136 MMOL/L (ref 136–145)
SP GR UR STRIP: 1.01 (ref 1–1.03)
SPECIMEN OUTDATE: NORMAL
SQUAMOUS #/AREA URNS HPF: 1 /HPF
URN SPEC COLLECT METH UR: ABNORMAL
UROBILINOGEN UR STRIP-ACNC: NEGATIVE EU/DL
WBC # BLD AUTO: 7.37 K/UL (ref 3.9–12.7)
WBC # BLD AUTO: 8.12 K/UL (ref 3.9–12.7)
WBC #/AREA URNS HPF: 2 /HPF (ref 0–5)

## 2024-05-21 PROCEDURE — 85025 COMPLETE CBC W/AUTO DIFF WBC: CPT | Mod: 91 | Performed by: NURSE PRACTITIONER

## 2024-05-21 PROCEDURE — 81000 URINALYSIS NONAUTO W/SCOPE: CPT | Performed by: EMERGENCY MEDICINE

## 2024-05-21 PROCEDURE — 86803 HEPATITIS C AB TEST: CPT | Performed by: EMERGENCY MEDICINE

## 2024-05-21 PROCEDURE — 80053 COMPREHEN METABOLIC PANEL: CPT | Performed by: NURSE PRACTITIONER

## 2024-05-21 PROCEDURE — 88305 TISSUE EXAM BY PATHOLOGIST: CPT | Performed by: PATHOLOGY

## 2024-05-21 PROCEDURE — G0378 HOSPITAL OBSERVATION PER HR: HCPCS

## 2024-05-21 PROCEDURE — 86901 BLOOD TYPING SEROLOGIC RH(D): CPT | Mod: 91 | Performed by: NURSE PRACTITIONER

## 2024-05-21 PROCEDURE — 88305 TISSUE EXAM BY PATHOLOGIST: CPT | Mod: 26,,, | Performed by: PATHOLOGY

## 2024-05-21 PROCEDURE — 87389 HIV-1 AG W/HIV-1&-2 AB AG IA: CPT | Performed by: EMERGENCY MEDICINE

## 2024-05-21 PROCEDURE — 84702 CHORIONIC GONADOTROPIN TEST: CPT | Performed by: NURSE PRACTITIONER

## 2024-05-21 PROCEDURE — 86901 BLOOD TYPING SEROLOGIC RH(D): CPT | Performed by: NURSE PRACTITIONER

## 2024-05-21 PROCEDURE — 99285 EMERGENCY DEPT VISIT HI MDM: CPT | Mod: 25

## 2024-05-21 PROCEDURE — 25000003 PHARM REV CODE 250: Performed by: NURSE PRACTITIONER

## 2024-05-21 RX ORDER — ACETAMINOPHEN 500 MG
1000 TABLET ORAL
Status: COMPLETED | OUTPATIENT
Start: 2024-05-21 | End: 2024-05-21

## 2024-05-21 RX ORDER — TALC
6 POWDER (GRAM) TOPICAL NIGHTLY PRN
Status: DISCONTINUED | OUTPATIENT
Start: 2024-05-21 | End: 2024-05-22 | Stop reason: HOSPADM

## 2024-05-21 RX ORDER — SODIUM CHLORIDE 0.9 % (FLUSH) 0.9 %
10 SYRINGE (ML) INJECTION
Status: DISCONTINUED | OUTPATIENT
Start: 2024-05-21 | End: 2024-05-22 | Stop reason: HOSPADM

## 2024-05-21 RX ADMIN — SODIUM CHLORIDE, POTASSIUM CHLORIDE, SODIUM LACTATE AND CALCIUM CHLORIDE 1000 ML: 600; 310; 30; 20 INJECTION, SOLUTION INTRAVENOUS at 11:05

## 2024-05-21 RX ADMIN — ACETAMINOPHEN 1000 MG: 500 TABLET ORAL at 10:05

## 2024-05-21 NOTE — TELEPHONE ENCOUNTER
Pt calling in stating she has been having a miscarriage for a few weeks. States she is approximately 8 weeks pregnant. Bleeding began on 5/7 or 5/8. Has upcoming appt with OB - asking if she can get U/S ordered. States bleeding has been heavier today and adds she has passed some clots. States she has no pain but has bled through 2 or more pads per hour for the last few hours. Care advice per protocol.  Pt states she feels she can safely get to ED per POV. Advised to call back with concerns or worsening symptoms. Verbalized understanding.     Reason for Disposition   SEVERE vaginal bleeding (e.g., soaking 2 pads or tampons per hour and present 2 or more hours; 1 menstrual cup every 2 hours)    Additional Information   Negative: Shock suspected (e.g., cold/pale/clammy skin, too weak to stand, low BP, rapid pulse)   Negative: Difficult to awaken or acting confused (e.g., disoriented, slurred speech)   Negative: Passed out (i.e., fainted, collapsed and was not responding)   Negative: Sounds like a life-threatening emergency to the triager   Negative: SEVERE abdominal pain (e.g., excruciating)    Protocols used: Pregnancy - Vaginal Bleeding Less Than 20 Weeks EGA-A-OH

## 2024-05-21 NOTE — TELEPHONE ENCOUNTER
I spoke to the pt and she states that she has passed a large clot that went In the toilet. Pt states she has been bleeding two pads an hour. Pt was advised to come In to the [facility] to be seen for this bleeding. Patient verbally understood .

## 2024-05-21 NOTE — LETTER
May 22, 2024         [address]  [place] [address]  Phone: [phone]  Fax: [phone]       Patient: Betina Jay   YOB: 1990  Date of Visit: 05/22/2024    To Whom It May Concern:    Eddy Jay  was at [facility] on 05/22/2024. The patient may return to work/school on 5/27/2024 with restrictions. Light duty for 2 weeks. If you have any questions or concerns, or if I can be of further assistance, please do not hesitate to contact me.    Sincerely,    Latha Hargrove RN

## 2024-05-21 NOTE — LETTER
May 22, 2024         [address]  [place] [address]  Phone: [phone]  Fax: [phone]       Patient: Betina Jay   YOB: 1990  Date of Visit: 05/22/2024    To Whom It May Concern:    Eddy Jay  was at [facility] on 05/22/2024. The patient may return to work/school on 5/27/24 with no restrictions. If you have any questions or concerns, or if I can be of further assistance, please do not hesitate to contact me.    Sincerely,    Avis Diggs MD

## 2024-05-21 NOTE — LETTER
May 22, 2024         [address]  [place] [address]  Phone: [phone]  Fax: [phone]       Patient: Betina Jay   YOB: 1990  Date of Visit: 05/22/2024    To Whom It May Concern:    Eddy Jay  was at [facility] on 05/22/2024. The patient may return to work/school on 5/27/24. She should be allowed 2 weeks of light duty in which she does not lift over 15 lbs. If you have any questions or concerns, or if I can be of further assistance, please do not hesitate to contact me.    Sincerely,    Hedy Oconnell MD

## 2024-05-22 ENCOUNTER — TELEPHONE (OUTPATIENT)
Dept: OBSTETRICS AND GYNECOLOGY | Facility: CLINIC | Age: 34
End: 2024-05-22
Payer: COMMERCIAL

## 2024-05-22 ENCOUNTER — ANESTHESIA EVENT (OUTPATIENT)
Dept: SURGERY | Facility: OTHER | Age: 34
End: 2024-05-22
Payer: COMMERCIAL

## 2024-05-22 ENCOUNTER — PATIENT MESSAGE (OUTPATIENT)
Dept: SURGERY | Facility: OTHER | Age: 34
End: 2024-05-22
Payer: COMMERCIAL

## 2024-05-22 ENCOUNTER — ANESTHESIA (OUTPATIENT)
Dept: SURGERY | Facility: OTHER | Age: 34
End: 2024-05-22
Payer: COMMERCIAL

## 2024-05-22 PROBLEM — Z98.890 STATUS POST DILATION AND CURETTAGE: Status: ACTIVE | Noted: 2024-05-22

## 2024-05-22 PROBLEM — N93.9 VAGINAL BLEEDING: Status: ACTIVE | Noted: 2024-05-22

## 2024-05-22 LAB
APTT PPP: 28.7 SEC (ref 21–32)
BASOPHILS # BLD AUTO: 0.03 K/UL (ref 0–0.2)
BASOPHILS # BLD AUTO: 0.04 K/UL (ref 0–0.2)
BASOPHILS NFR BLD: 0.2 % (ref 0–1.9)
BASOPHILS NFR BLD: 0.5 % (ref 0–1.9)
BLD PROD TYP BPU: NORMAL
BLOOD UNIT EXPIRATION DATE: NORMAL
BLOOD UNIT TYPE CODE: 6200
BLOOD UNIT TYPE: NORMAL
CODING SYSTEM: NORMAL
CROSSMATCH INTERPRETATION: NORMAL
DIFFERENTIAL METHOD BLD: ABNORMAL
DIFFERENTIAL METHOD BLD: ABNORMAL
DISPENSE STATUS: NORMAL
EOSINOPHIL # BLD AUTO: 0 K/UL (ref 0–0.5)
EOSINOPHIL # BLD AUTO: 0.3 K/UL (ref 0–0.5)
EOSINOPHIL NFR BLD: 0.1 % (ref 0–8)
EOSINOPHIL NFR BLD: 2.9 % (ref 0–8)
ERYTHROCYTE [DISTWIDTH] IN BLOOD BY AUTOMATED COUNT: 14.2 % (ref 11.5–14.5)
ERYTHROCYTE [DISTWIDTH] IN BLOOD BY AUTOMATED COUNT: 14.4 % (ref 11.5–14.5)
FIBRINOGEN PPP-MCNC: 222 MG/DL (ref 182–400)
HCT VFR BLD AUTO: 25.2 % (ref 37–48.5)
HCT VFR BLD AUTO: 31.7 % (ref 37–48.5)
HGB BLD-MCNC: 10.6 G/DL (ref 12–16)
HGB BLD-MCNC: 8.4 G/DL (ref 12–16)
IMM GRANULOCYTES # BLD AUTO: 0.03 K/UL (ref 0–0.04)
IMM GRANULOCYTES # BLD AUTO: 0.05 K/UL (ref 0–0.04)
IMM GRANULOCYTES NFR BLD AUTO: 0.3 % (ref 0–0.5)
IMM GRANULOCYTES NFR BLD AUTO: 0.4 % (ref 0–0.5)
INR PPP: 1 (ref 0.8–1.2)
LYMPHOCYTES # BLD AUTO: 1.1 K/UL (ref 1–4.8)
LYMPHOCYTES # BLD AUTO: 3.6 K/UL (ref 1–4.8)
LYMPHOCYTES NFR BLD: 41.8 % (ref 18–48)
LYMPHOCYTES NFR BLD: 7.3 % (ref 18–48)
MCH RBC QN AUTO: 29.2 PG (ref 27–31)
MCH RBC QN AUTO: 29.3 PG (ref 27–31)
MCHC RBC AUTO-ENTMCNC: 33.3 G/DL (ref 32–36)
MCHC RBC AUTO-ENTMCNC: 33.4 G/DL (ref 32–36)
MCV RBC AUTO: 87 FL (ref 82–98)
MCV RBC AUTO: 88 FL (ref 82–98)
MONOCYTES # BLD AUTO: 0.2 K/UL (ref 0.3–1)
MONOCYTES # BLD AUTO: 0.5 K/UL (ref 0.3–1)
MONOCYTES NFR BLD: 1.3 % (ref 4–15)
MONOCYTES NFR BLD: 6 % (ref 4–15)
NEUTROPHILS # BLD AUTO: 13.3 K/UL (ref 1.8–7.7)
NEUTROPHILS # BLD AUTO: 4.2 K/UL (ref 1.8–7.7)
NEUTROPHILS NFR BLD: 48.4 % (ref 38–73)
NEUTROPHILS NFR BLD: 90.8 % (ref 38–73)
NRBC BLD-RTO: 0 /100 WBC
NRBC BLD-RTO: 0 /100 WBC
PLATELET # BLD AUTO: 231 K/UL (ref 150–450)
PLATELET # BLD AUTO: 246 K/UL (ref 150–450)
PMV BLD AUTO: 9.3 FL (ref 9.2–12.9)
PMV BLD AUTO: 9.4 FL (ref 9.2–12.9)
PROTHROMBIN TIME: 11.5 SEC (ref 9–12.5)
RBC # BLD AUTO: 2.87 M/UL (ref 4–5.4)
RBC # BLD AUTO: 3.63 M/UL (ref 4–5.4)
TRANS ERYTHROCYTES VOL PATIENT: NORMAL ML
WBC # BLD AUTO: 14.67 K/UL (ref 3.9–12.7)
WBC # BLD AUTO: 8.56 K/UL (ref 3.9–12.7)

## 2024-05-22 PROCEDURE — 85025 COMPLETE CBC W/AUTO DIFF WBC: CPT

## 2024-05-22 PROCEDURE — 63600175 PHARM REV CODE 636 W HCPCS: Performed by: ANESTHESIOLOGY

## 2024-05-22 PROCEDURE — 63600175 PHARM REV CODE 636 W HCPCS

## 2024-05-22 PROCEDURE — 37000008 HC ANESTHESIA 1ST 15 MINUTES: Performed by: OBSTETRICS & GYNECOLOGY

## 2024-05-22 PROCEDURE — 36415 COLL VENOUS BLD VENIPUNCTURE: CPT

## 2024-05-22 PROCEDURE — 88305 TISSUE EXAM BY PATHOLOGIST: CPT | Mod: 26,,, | Performed by: PATHOLOGY

## 2024-05-22 PROCEDURE — D9220A PRA ANESTHESIA: Mod: QY,ANES,, | Performed by: ANESTHESIOLOGY

## 2024-05-22 PROCEDURE — 85610 PROTHROMBIN TIME: CPT | Performed by: OBSTETRICS & GYNECOLOGY

## 2024-05-22 PROCEDURE — 63600175 PHARM REV CODE 636 W HCPCS: Performed by: STUDENT IN AN ORGANIZED HEALTH CARE EDUCATION/TRAINING PROGRAM

## 2024-05-22 PROCEDURE — D9220A PRA ANESTHESIA: Mod: QX,CRNA,, | Performed by: STUDENT IN AN ORGANIZED HEALTH CARE EDUCATION/TRAINING PROGRAM

## 2024-05-22 PROCEDURE — P9021 RED BLOOD CELLS UNIT: HCPCS

## 2024-05-22 PROCEDURE — 85384 FIBRINOGEN ACTIVITY: CPT | Performed by: OBSTETRICS & GYNECOLOGY

## 2024-05-22 PROCEDURE — 96374 THER/PROPH/DIAG INJ IV PUSH: CPT

## 2024-05-22 PROCEDURE — 85730 THROMBOPLASTIN TIME PARTIAL: CPT | Performed by: OBSTETRICS & GYNECOLOGY

## 2024-05-22 PROCEDURE — 36000704 HC OR TIME LEV I 1ST 15 MIN: Performed by: OBSTETRICS & GYNECOLOGY

## 2024-05-22 PROCEDURE — 59812 TREATMENT OF MISCARRIAGE: CPT | Mod: ,,, | Performed by: OBSTETRICS & GYNECOLOGY

## 2024-05-22 PROCEDURE — 25000003 PHARM REV CODE 250: Performed by: STUDENT IN AN ORGANIZED HEALTH CARE EDUCATION/TRAINING PROGRAM

## 2024-05-22 PROCEDURE — 36000705 HC OR TIME LEV I EA ADD 15 MIN: Performed by: OBSTETRICS & GYNECOLOGY

## 2024-05-22 PROCEDURE — 25000003 PHARM REV CODE 250: Performed by: ANESTHESIOLOGY

## 2024-05-22 PROCEDURE — 88305 TISSUE EXAM BY PATHOLOGIST: CPT | Performed by: PATHOLOGY

## 2024-05-22 PROCEDURE — G0378 HOSPITAL OBSERVATION PER HR: HCPCS

## 2024-05-22 PROCEDURE — 37000009 HC ANESTHESIA EA ADD 15 MINS: Performed by: OBSTETRICS & GYNECOLOGY

## 2024-05-22 PROCEDURE — 85025 COMPLETE CBC W/AUTO DIFF WBC: CPT | Mod: 91 | Performed by: OBSTETRICS & GYNECOLOGY

## 2024-05-22 PROCEDURE — 86920 COMPATIBILITY TEST SPIN: CPT

## 2024-05-22 PROCEDURE — 71000033 HC RECOVERY, INTIAL HOUR: Performed by: OBSTETRICS & GYNECOLOGY

## 2024-05-22 RX ORDER — ONDANSETRON 8 MG/1
8 TABLET, ORALLY DISINTEGRATING ORAL EVERY 8 HOURS PRN
Status: DISCONTINUED | OUTPATIENT
Start: 2024-05-22 | End: 2024-05-22 | Stop reason: HOSPADM

## 2024-05-22 RX ORDER — SODIUM CHLORIDE 0.9 % (FLUSH) 0.9 %
3 SYRINGE (ML) INJECTION
Status: DISCONTINUED | OUTPATIENT
Start: 2024-05-22 | End: 2024-05-22 | Stop reason: HOSPADM

## 2024-05-22 RX ORDER — DIPHENHYDRAMINE HYDROCHLORIDE 50 MG/ML
12.5 INJECTION INTRAMUSCULAR; INTRAVENOUS ONCE
Status: COMPLETED | OUTPATIENT
Start: 2024-05-22 | End: 2024-05-22

## 2024-05-22 RX ORDER — HYDROMORPHONE HYDROCHLORIDE 2 MG/ML
0.4 INJECTION, SOLUTION INTRAMUSCULAR; INTRAVENOUS; SUBCUTANEOUS EVERY 5 MIN PRN
Status: DISCONTINUED | OUTPATIENT
Start: 2024-05-22 | End: 2024-05-22 | Stop reason: HOSPADM

## 2024-05-22 RX ORDER — MEPERIDINE HYDROCHLORIDE 25 MG/ML
12.5 INJECTION INTRAMUSCULAR; INTRAVENOUS; SUBCUTANEOUS ONCE AS NEEDED
Status: DISCONTINUED | OUTPATIENT
Start: 2024-05-22 | End: 2024-05-22 | Stop reason: HOSPADM

## 2024-05-22 RX ORDER — HYDROCODONE BITARTRATE AND ACETAMINOPHEN 500; 5 MG/1; MG/1
TABLET ORAL
Status: DISCONTINUED | OUTPATIENT
Start: 2024-05-22 | End: 2024-05-22 | Stop reason: HOSPADM

## 2024-05-22 RX ORDER — SODIUM CHLORIDE 9 MG/ML
INJECTION, SOLUTION INTRAVENOUS CONTINUOUS
Status: CANCELLED | OUTPATIENT
Start: 2024-05-22

## 2024-05-22 RX ORDER — DEXTROMETHORPHAN HYDROBROMIDE, GUAIFENESIN 5; 100 MG/5ML; MG/5ML
650 LIQUID ORAL EVERY 6 HOURS
Qty: 30 TABLET | Refills: 0 | Status: SHIPPED | OUTPATIENT
Start: 2024-05-22

## 2024-05-22 RX ORDER — LIDOCAINE HYDROCHLORIDE 20 MG/ML
INJECTION INTRAVENOUS
Status: DISCONTINUED | OUTPATIENT
Start: 2024-05-22 | End: 2024-05-22

## 2024-05-22 RX ORDER — DIPHENHYDRAMINE HYDROCHLORIDE 50 MG/ML
INJECTION INTRAMUSCULAR; INTRAVENOUS
Status: DISCONTINUED
Start: 2024-05-22 | End: 2024-05-22 | Stop reason: HOSPADM

## 2024-05-22 RX ORDER — DEXAMETHASONE SODIUM PHOSPHATE 4 MG/ML
INJECTION, SOLUTION INTRA-ARTICULAR; INTRALESIONAL; INTRAMUSCULAR; INTRAVENOUS; SOFT TISSUE
Status: DISCONTINUED | OUTPATIENT
Start: 2024-05-22 | End: 2024-05-22

## 2024-05-22 RX ORDER — ONDANSETRON HYDROCHLORIDE 2 MG/ML
INJECTION, SOLUTION INTRAVENOUS
Status: DISCONTINUED | OUTPATIENT
Start: 2024-05-22 | End: 2024-05-22

## 2024-05-22 RX ORDER — OXYCODONE HYDROCHLORIDE 5 MG/1
5 TABLET ORAL EVERY 4 HOURS PRN
Qty: 5 TABLET | Refills: 0 | Status: SHIPPED | OUTPATIENT
Start: 2024-05-22

## 2024-05-22 RX ORDER — IBUPROFEN 600 MG/1
600 TABLET ORAL EVERY 6 HOURS
Qty: 30 TABLET | Refills: 0 | Status: SHIPPED | OUTPATIENT
Start: 2024-05-22

## 2024-05-22 RX ORDER — PROPOFOL 10 MG/ML
VIAL (ML) INTRAVENOUS
Status: DISCONTINUED | OUTPATIENT
Start: 2024-05-22 | End: 2024-05-22

## 2024-05-22 RX ORDER — OXYCODONE HYDROCHLORIDE 5 MG/1
5 TABLET ORAL
Status: DISCONTINUED | OUTPATIENT
Start: 2024-05-22 | End: 2024-05-22 | Stop reason: HOSPADM

## 2024-05-22 RX ORDER — MUPIROCIN 20 MG/G
OINTMENT TOPICAL
Status: CANCELLED | OUTPATIENT
Start: 2024-05-22

## 2024-05-22 RX ORDER — PROCHLORPERAZINE EDISYLATE 5 MG/ML
5 INJECTION INTRAMUSCULAR; INTRAVENOUS EVERY 30 MIN PRN
Status: DISCONTINUED | OUTPATIENT
Start: 2024-05-22 | End: 2024-05-22 | Stop reason: HOSPADM

## 2024-05-22 RX ORDER — FENTANYL CITRATE 50 UG/ML
INJECTION, SOLUTION INTRAMUSCULAR; INTRAVENOUS
Status: DISCONTINUED | OUTPATIENT
Start: 2024-05-22 | End: 2024-05-22

## 2024-05-22 RX ORDER — PHENYLEPHRINE HYDROCHLORIDE 10 MG/ML
INJECTION INTRAVENOUS
Status: DISCONTINUED | OUTPATIENT
Start: 2024-05-22 | End: 2024-05-22

## 2024-05-22 RX ORDER — SODIUM CHLORIDE 0.9 % (FLUSH) 0.9 %
10 SYRINGE (ML) INJECTION
Status: DISCONTINUED | OUTPATIENT
Start: 2024-05-22 | End: 2024-05-22 | Stop reason: HOSPADM

## 2024-05-22 RX ORDER — FAMOTIDINE 20 MG/1
20 TABLET, FILM COATED ORAL
Status: DISCONTINUED | OUTPATIENT
Start: 2024-05-22 | End: 2024-05-22 | Stop reason: HOSPADM

## 2024-05-22 RX ADMIN — DIPHENHYDRAMINE HYDROCHLORIDE 12.5 MG: 50 INJECTION, SOLUTION INTRAMUSCULAR; INTRAVENOUS at 02:05

## 2024-05-22 RX ADMIN — LIDOCAINE HYDROCHLORIDE 60 MG: 20 INJECTION, SOLUTION INTRAVENOUS at 01:05

## 2024-05-22 RX ADMIN — DIPHENHYDRAMINE HYDROCHLORIDE 12.5 MG: 50 INJECTION, SOLUTION INTRAMUSCULAR; INTRAVENOUS at 04:05

## 2024-05-22 RX ADMIN — DEXAMETHASONE SODIUM PHOSPHATE 8 MG: 4 INJECTION, SOLUTION INTRAMUSCULAR; INTRAVENOUS at 01:05

## 2024-05-22 RX ADMIN — HYDROMORPHONE HYDROCHLORIDE 0.4 MG: 2 INJECTION INTRAMUSCULAR; INTRAVENOUS; SUBCUTANEOUS at 02:05

## 2024-05-22 RX ADMIN — PHENYLEPHRINE HYDROCHLORIDE 200 MCG: 10 INJECTION INTRAVENOUS at 01:05

## 2024-05-22 RX ADMIN — FENTANYL CITRATE 100 MCG: 50 INJECTION, SOLUTION INTRAMUSCULAR; INTRAVENOUS at 01:05

## 2024-05-22 RX ADMIN — OXYCODONE 5 MG: 5 TABLET ORAL at 02:05

## 2024-05-22 RX ADMIN — ONDANSETRON HYDROCHLORIDE 4 MG: 2 INJECTION INTRAMUSCULAR; INTRAVENOUS at 01:05

## 2024-05-22 RX ADMIN — SODIUM CHLORIDE: 0.9 INJECTION, SOLUTION INTRAVENOUS at 01:05

## 2024-05-22 RX ADMIN — DOXYCYCLINE 200 MG: 100 INJECTION, POWDER, LYOPHILIZED, FOR SOLUTION INTRAVENOUS at 01:05

## 2024-05-22 RX ADMIN — SODIUM CHLORIDE, SODIUM LACTATE, POTASSIUM CHLORIDE, AND CALCIUM CHLORIDE: .6; .31; .03; .02 INJECTION, SOLUTION INTRAVENOUS at 01:05

## 2024-05-22 RX ADMIN — PROPOFOL 100 MG: 10 INJECTION, EMULSION INTRAVENOUS at 01:05

## 2024-05-22 NOTE — DISCHARGE SUMMARY
Discharge Summary  Gynecology      Admit Date: 5/21/2024    Discharge Date and Time: 5/22/2024     Attending Physician: Lelo Lizarraga MD    Principal Diagnoses: Status post dilation and curettage    Active Hospital Problems    Diagnosis  POA    *S/P Suction D&C - 5/22/24 [Z98.890]  Not Applicable    Vaginal bleeding [N93.9]  Yes      Resolved Hospital Problems   No resolved problems to display.       Procedures: Procedure(s) (LRB):  DILATION AND CURETTAGE, UTERUS, USING SUCTION (N/A)    Discharged Condition: good    Hospital Course:   Betina Jay is a 33 y.o. y.o. No obstetric history on file. female who presented on 5/21/2024   for above procedures for the treatment of an incomplete AB. Patient tolerated procedure. Post-operative course was uncomplicated.  On day of discharge, patient was urinating, ambulating, and tolerating a regular diet without difficulty. Pain was well controlled on PO medication. She was discharged home on POD#0 in stable condition with instructions to follow up with the GYN resident clinic in 4 weeks.     Consults: None    Significant Diagnostic Studies:  Recent Labs   Lab 05/21/24  2232 05/22/24  0133 05/22/24  0529   WBC 8.12 8.56 14.67*   HGB 9.2* 8.4* 10.6*   HCT 27.9* 25.2* 31.7*   MCV 88 88 87    246 231        Treatments:   1. Surgery as above    Disposition: Home or Self Care    Patient Instructions:   Current Discharge Medication List        START taking these medications    Details   acetaminophen (TYLENOL) 650 MG TbSR Take 1 tablet (650 mg total) by mouth every 6 (six) hours. Alternate with ibuprofen  Qty: 30 tablet, Refills: 0      ibuprofen (ADVIL,MOTRIN) 600 MG tablet Take 1 tablet (600 mg total) by mouth every 6 (six) hours. Alternate with tylenol  Qty: 30 tablet, Refills: 0      oxyCODONE (OXY-IR) 5 mg Cap Take 1 capsule (5 mg total) by mouth every 4 (four) hours as needed for Pain.  Qty: 5 capsule, Refills: 0    Comments: Quantity prescribed more than 7 day  supply? No           CONTINUE these medications which have NOT CHANGED    Details   albuterol (PROVENTIL/VENTOLIN HFA) 90 mcg/actuation inhaler INHALE 2 PUFFS BY MOUTH EVERY 6 HOURS AS NEEDED FOR WHEEZING  Qty: 18 g, Refills: 0    Associated Diagnoses: Chronic cough      etonogestreL-ethinyl estradioL (NUVARING) 0.12-0.015 mg/24 hr vaginal ring Place vaginally.      fluticasone propionate (FLONASE) 50 mcg/actuation nasal spray 1 spray (50 mcg total) by Each Nostril route once daily.  Qty: 18.2 mL, Refills: 0    Associated Diagnoses: Post-nasal drip      ketoconazole (NIZORAL) 2 % shampoo Apply topically twice a week.  Qty: 120 mL, Refills: 2    Associated Diagnoses: Seborrheic dermatitis      levocetirizine (XYZAL) 5 MG tablet Take 1 tablet (5 mg total) by mouth every evening.  Qty: 90 tablet, Refills: 3    Associated Diagnoses: Post-nasal drip      sertraline (ZOLOFT) 50 MG tablet Take 1 tablet (50 mg total) by mouth once daily.  Qty: 90 tablet, Refills: 3    Associated Diagnoses: BRIDGET (generalized anxiety disorder)      triamcinolone acetonide 0.5% (KENALOG) 0.5 % Crea Apply topically 2 (two) times daily.  Qty: 454 g, Refills: 0    Associated Diagnoses: Atopic dermatitis, unspecified type             Discharge Procedure Orders   HCG, QUANTITATIVE, PREGNANCY   Standing Status: Future Standing Exp. Date: 08/19/25     Order Specific Question Answer Comments   Release to patient Immediate      Diet general     Lifting restrictions   Order Comments: LIFTING:  No lifting greater than 15 pounds for six weeks.     PELVIC REST:  No douching, tampons, or intercourse for 6 weeks.  If prescribed, vaginal estrogen cream may be used during the postoperative period.     Other restrictions (specify):   Order Comments: DRIVING:  No driving while on narcotics. Driving may be resumed initially with a competent passenger one to two weeks after surgery if no longer taking narcotics.     EXERCISE:  For six weeks your exercise should be  limited to walking. You may walk as far as you wish, as long as you increase your level of exertion gradually and avoid slippery surfaces. You may climb stairs as needed to get around, but should not use stair climbing for exercise.     Call MD for:  temperature >100.4     Call MD for:  persistent nausea and vomiting     Call MD for:  severe uncontrolled pain     Call MD for:  difficulty breathing, headache or visual disturbances     Call MD for:  redness, tenderness, or signs of infection (pain, swelling, redness, odor or green/yellow discharge around incision site)     Call MD for:  hives     Call MD for:   Order Comments: inability to void,urine is ketchup colored or you have large clots, vaginal bleeding is heavier than a period.    VAGINAL DISCHARGE: You may develop a vaginal discharge and intermittent vaginal spotting after surgery and up to 6 weeks postoperatively.  The discharge may have an odor and may change in color but it is normal.  This is due to dissolving stiches.  Contact your surgical team if you develop vaginal or vulvar irritation along with a discharge.  Also contact your surgical team if you have vaginal discharge that smells like urine or stool.    PAIN MEDICATIONS:     Take your pain medications as instructed. It is best to take pain medications before your pain becomes severe. This will allow you to take less medication yet have better pain relief. For the first 2 or 3 days it may be helpful to take your pain medications on a regular schedule (e.g. every 4 to 6 hours). This will help you to keep your pain under better control. You should then begin to take fewer medications each day until you no longer need them. Do not take pain medication on an empty stomach. This may lead to nausea and vomiting.    CONSTIPATION REMEDIES: Patients are often constipated after surgery or with use of oral narcotic medicine. You should continue to take the stool softener, Senokot-S during the next six weeks,  and consume adequate amounts of water.  If you have not had a bowel movement for 3 days after dismissal, or are uncomfortable and unable to pass stool, please try one or all of the following measures:  1.  Milk of Magnesia - 30 cc by mouth every 12 hours   2.  Dulcolax suppository - One suppository per rectum every 4-6 hours   3.  Metamucil, Fibercon or other bulk former - use as directed  4.  Fleets Enema  5.  Prunes or Prune juice    If you continue to have constipation after trying the above remedies, you should contact your surgical team using the contact information listed above     No dressing needed     Activity as tolerated   Order Comments: Return to normal activity slowly as you feel able.  For 6 weeks your exercise should be limited to walking.  You may walk as far as you wish, as long as you increase your level of exertion gradually and avoid slippery surfaces.     Shower on day dressing removed (No bath)   Order Comments: You may shower at any time but should avoid immersing any abdominal incisions in water for at least 2 weeks after surgery or until the wound is completely healed.  If given, please shower with Hibaclens soap until bottle is completely finish        Follow-up Information       [facility] Emergency Dept. Go to .    Specialty: Emergency Medicine  Why: If symptoms worsen  Contact information:  [address]  [facility] [address] [phone]             [facility]. Call .    Specialty: Obstetrics and Gynecology  Contact information:  [address]  [facility] [address] [phone]  Additional information:  [facility], 1st Floor   Please park in [place]             [facility] Follow up in 4 week(s).    Why: Post operative visit - I have also messaged Dr. Lizarraga's clinic for you to have care established with her. They should be reaching out to you shortly.                          Hedy Oconnell MD  Obstetrics and Gynecology - PGY1

## 2024-05-22 NOTE — PLAN OF CARE
05/22/24 1158   Final Note   Assessment Type Final Discharge Note   Anticipated Discharge Disposition Home   Hospital Resources/Appts/Education Provided Provided patient/caregiver with written discharge plan information;Appointments scheduled and added to AVS   Post-Acute Status   Discharge Delays None known at this time     Pt states she lives independently at home.     Family to provide transportation home.    No DC needs from CM perspective.

## 2024-05-22 NOTE — H&P
[facility] ED Observation Unit  History and Physical      I assumed care of this patient from the Emergency Department at onset of observation time, 9:32 PM on 2024.       History of Present Illness:  Betina Jay is a 33 y.o. female presenting with c/o heavy vaginal bleeding that began today, reports passing multiple clots.  Patient endorses she had a positive pregnancy test in April reports on the 7th of this month she began having heavy vaginal bleeding with some lower abdominal cramping, reports bleeding has improved but she has been spotting for the last few days until today when she started having the heavier bleeding.  Patient reports she had not had a confirmatory ultrasound or seen her OBGYN.  Reports mild lightheadedness when standing.  She denies any fever/chills or any abdominal pain today.      ED Course:  Labs revealed mild anemia with a hemoglobin of 10.8, on exam there was no abdominal tenderness. Beta was elevated at 3200. Ultrasound revealed no confirmed IUP but thickened endometrial canal. I spoke with on-call OBGYN who recommended repeat beta in 48 hours as this is technically a pregnancy of unknown location. Due to heavy vaginal bleeding I believe patient needs to be monitored overnight with q.4 hours CBC is, she will be admitted to the EDOU for repeat labs and possible OBGYN consult in the a.m. if bleeding is persistent.     I reviewed the ED Provider Note dated 2024   prior to my evaluation of this patient.  I reviewed all labs and imaging performed in the Main ED, prior to patient being placed in Observation. Patient was placed in the ED Observation Unit for vaginal bleeding during pregnancy.    PMHx   Past Medical History:   Diagnosis Date    Anxiety     History of HPV infection     Other depression       Past Surgical History:   Procedure Laterality Date    CERVICAL BIOPSY  W/ LOOP ELECTRODE EXCISION       SECTION          Family Hx   Family History   Problem  "Relation Name Age of Onset    Hypertension Mother      Hyperlipidemia Father      Heart disease Paternal Grandmother      Cancer Neg Hx      Stroke Neg Hx          Social Hx   Social History     Socioeconomic History    Marital status: Single    Number of children: 2    Highest education level: Associate degree: occupational, technical, or vocational program   Tobacco Use    Smoking status: Never    Smokeless tobacco: Never   Substance and Sexual Activity    Alcohol use: Yes     Alcohol/week: 1.0 standard drink of alcohol     Types: 1 Glasses of wine per week     Comment: Social drinker    Drug use: Not Currently     Types: Marijuana     Comment: stopped smoking THC in 2021    Sexual activity: Yes     Partners: Male     Birth control/protection: Inserts        Vital Signs   Vitals:    05/21/24 1845 05/21/24 2124 05/21/24 2125 05/21/24 2127   BP: 123/72 110/66 113/76 115/80   Pulse: 86 88 90 105   Resp: 20      Temp: 98.2 °F (36.8 °C)      TempSrc: Oral      SpO2: 98% 100% 100% 100%   Weight: 63.5 kg (140 lb)      Height: 5' 3" (1.6 m)          Review of Systems  Review of Systems   Constitutional:  Negative for chills and fever.   Respiratory:  Negative for cough and shortness of breath.    Cardiovascular:  Negative for chest pain.   Gastrointestinal:  Positive for abdominal pain. Negative for nausea and vomiting.   Genitourinary:         Vaginal bleeding   Neurological:         Lightheaded   All other systems reviewed and are negative.      Brief Physical Exam/Reassessment   Physical Exam    Nursing note and vitals reviewed.  Constitutional: Vital signs are normal. She appears well-developed and well-nourished. She does not appear ill. No distress.   Neck: Neck supple.   Cardiovascular:  Normal rate, regular rhythm, S1 normal, S2 normal and normal heart sounds.           Pulmonary/Chest: Effort normal and breath sounds normal. No tachypnea. She has no decreased breath sounds. She has no wheezes.   Abdominal: " Abdomen is soft and flat. There is no abdominal tenderness.   Genitourinary: Rectum:      No tenderness.      Vaginal bleeding present.   There is bleeding in the vagina.    No signs of injury in the vagina.     Musculoskeletal:      Cervical back: Neck supple.     Neurological: She is alert and oriented to person, place, and time. GCS eye subscore is 4. GCS verbal subscore is 5. GCS motor subscore is 6.   Skin: Skin is warm, dry and intact.   Psychiatric: She has a normal mood and affect. Her behavior is normal.         Labs Reviewed   CBC W/ AUTO DIFFERENTIAL - Abnormal; Notable for the following components:       Result Value    RBC 3.70 (*)     Hemoglobin 10.8 (*)     Hematocrit 32.7 (*)     All other components within normal limits    Narrative:     Release to patient->Immediate   URINALYSIS, REFLEX TO URINE CULTURE - Abnormal; Notable for the following components:    Color, UA Brown (*)     Protein, UA Trace (*)     Occult Blood UA 3+ (*)     All other components within normal limits    Narrative:     Specimen Source->Urine   URINALYSIS MICROSCOPIC - Abnormal; Notable for the following components:    RBC, UA >100 (*)     All other components within normal limits    Narrative:     Specimen Source->Urine   COMPREHENSIVE METABOLIC PANEL    Narrative:     Release to patient->Immediate   HCG, QUANTITATIVE    Narrative:     Release to patient->Immediate   HIV 1 / 2 ANTIBODY    Narrative:     Release to patient->Immediate   HEPATITIS C ANTIBODY    Narrative:     Release to patient->Immediate   POCT URINE PREGNANCY   GROUP & RH     US OB <14 Wks TransAbd & TransVag, Single Gestation (XPD)   Final Result   Abnormal      An intrauterine pregnancy is not identified, heterogeneity along the endometrial canal noted with vascularity, this can be seen with retained products of conception, close clinical and historical correlation and follow-up serial beta HCG level and follow-up ultrasound is recommended.      This report  was flagged in Epic as abnormal.      .         Electronically signed by: Amaury Cruz   Date:    05/21/2024   Time:    21:11            Medications:   Scheduled Meds:  Continuous Infusions:  PRN Meds:.  Current Facility-Administered Medications:     melatonin, 6 mg, Oral, Nightly PRN    sodium chloride 0.9%, 10 mL, Intravenous, PRN      Assessment/Plan:    1. Pregnancy, location unknown     - trend cbc, OB consult in AM if needed   2. Vaginal bleeding in pregnancy        Case was discussed with the ED provider, Glory Cuenca MD

## 2024-05-22 NOTE — PROVIDER PROGRESS NOTES - EMERGENCY DEPT.
Encounter Date: 5/21/2024    ED Physician Progress Notes        Physician Note:   12:03 AM    The patient has been evaluated by Ob/gyn while still in the emergency department and [facility].  The patient will be admitted to their service instead of the EDOU as initially planned.

## 2024-05-22 NOTE — OP NOTE
OPERATIVE NOTE    DATE OF PROCEDURE: 2024    SURGEON: Lelo Lizarraga MD    ASSISTANT: Catrina Gauthier MD - PGY-1    PREOPERATIVE DIAGNOSIS:   Suspected incomplete     POSTOPERATIVE DIAGNOSIS:   Same   S/p Suction D&C    PROCEDURE:   Suction Dilation and curettage    ANESTHESIA: General    FINDINGS: Cervix was already dilated to 1cm. Suction D&C performed yielding moderate amount of tissue. Bleeding minimal at completion of procedure.    IV FLUIDS: See anesthesia records     UOP: 100 mL drained via in and out bladder catheterization prior to procedure     ESTIMATED BLOOD LOSS: 30 mL    SPECIMEN:   Products of conception     PROCEDURE IN DETAIL: After proper consents were explained and obtained, the   patient was taken to the Operating Room where general anesthesia was obtained without difficulty. She was then placed in dorsal lithotomy position using David stirrups. The patient was prepped and draped in normal sterile fashion. Bladder was drained via in-and-out catheterization prior to procedure. A surgical timeout was performed with patient's name, date of birth, allergies, and procedure to be performed verbalized. All OR staff were in agreement. Preoperative antibiotics doxycycline 200 mg were administered as indicated.     Attention was then turned to the vagina where a right angle retractor was placed in the vagina to visualize the cervix, which was grasped at the anterior lip with the single tooth tenaculum. The cervix was already dilated to 1cm, and a 8 mm curved suction curette was introduced into the endometrial cavity and suction was applied to remove the products of conception. Serial sweeps were performed and products of conception were removed. Using a Kandi curette, serial sweeps were performed until a gritty consistency of the uterine lining was felt in all 4 quadrants. A final pass was performed with the suction curette to ensure that all remaining products of conception were removed.  Bleeding from the uterus was minimal to none at completion of the procedure.    The tenaculum was removed and good hemostasis was noted. All instruments were removed from the vagina. All counts were correct x2.    The patient tolerated the procedure well and was awakened without difficulty.  The patient was taken to Recovery area in stable condition.    Catrina Gauthier MD  OB/GYN PGY1

## 2024-05-22 NOTE — PLAN OF CARE
MSW met with the patient at the bedside.     Patient is alert and oriented with no communication barriers.     Patient denies having DME at home.     Patients PCP is correct on the face sheet. Patient choice pharmacy is bedside delivery.     Patients' family will transport the patient home at discharge.     CM team will continue to follow.      05/22/24 0923   Discharge Assessment   Assessment Type Discharge Planning Assessment   Confirmed/corrected address, phone number and insurance Yes   Confirmed Demographics Correct on Facesheet   Source of Information patient;health record   People in Home alone   Do you expect to return to your current living situation? Yes   Do you have help at home or someone to help you manage your care at home? Yes   Prior to hospitilization cognitive status: Alert/Oriented   Current cognitive status: Alert/Oriented   Walking or Climbing Stairs Difficulty no   Dressing/Bathing Difficulty no   Equipment Currently Used at Home none   Readmission within 30 days? No   Patient currently being followed by outpatient case management? No   Do you currently have service(s) that help you manage your care at home? No   Do you take prescription medications? No   Do you have prescription coverage? Yes   Do you have any problems affording any of your prescribed medications? No   Is the patient taking medications as prescribed? yes   How do you get to doctors appointments? car, drives self;family or friend will provide   Are you on dialysis? No   Do you take coumadin? No   Discharge Plan A Home with family   Discharge Plan B Home Health   DME Needed Upon Discharge  none   Discharge Plan discussed with: Patient   Transition of Care Barriers None

## 2024-05-22 NOTE — ED PROVIDER NOTES
Source of History:  Patient    Chief complaint:  Vaginal Bleeding (Pt. Reports heavy vaginal bleeding. Pt. States she had a miscarriage on the . Pt. Reports large clots today and lightheadedness. Pt. Is alert and ABC's are intact.)      HPI:  Betina Jay is a 33 y.o. female presenting with c/o heavy vaginal bleeding that began today, reports passing multiple clots.  Patient endorses she had a positive pregnancy test in April reports on the  of this month she began having heavy vaginal bleeding with some lower abdominal cramping, reports bleeding has improved but she has been spotting for the last few days until today when she started having the heavier bleeding.  Patient reports she had not had a confirmatory ultrasound or seen her OBGYN.  Reports mild lightheadedness when standing.  She denies any fever/chills or any abdominal pain today.    This is the extent to the patients complaints today here in the emergency department.    PMH:  As per HPI and below:  Past Medical History:   Diagnosis Date    Anxiety     History of HPV infection     Other depression      Past Surgical History:   Procedure Laterality Date    CERVICAL BIOPSY  W/ LOOP ELECTRODE EXCISION       SECTION         Social History     Tobacco Use    Smoking status: Never    Smokeless tobacco: Never   Substance Use Topics    Alcohol use: Yes     Alcohol/week: 1.0 standard drink of alcohol     Types: 1 Glasses of wine per week     Comment: Social drinker    Drug use: Not Currently     Types: Marijuana     Comment: stopped smoking THC in      Review of patient's allergies indicates:   Allergen Reactions    Penicillins Hives       ROS: As per HPI and below:  Constitutional: No fever.  No chills.  Eyes: No visual changes.  ENT: No sore throat. No ear pain    Cardiovascular: No chest pain.  Respiratory: No shortness of breath.  GI:  No abdominal pain or cramping  Genitourinary:  Vaginal bleeding  Neurologic: No headache. No focal  "weakness.  No numbness.  Lightheadedness.  MSK: no back pain   Integument: No rashes or lesions.  Hematologic: No easy bruising.  Endocrine: No excessive thirst or urination.    Physical Exam:    BP (!) 95/59 (BP Location: Right arm, Patient Position: Lying) Comment: LOR Ashley notified  Pulse 84   Temp 98.8 °F (37.1 °C) (Oral)   Resp 14   Ht 5' 3" (1.6 m)   Wt 63.5 kg (140 lb)   SpO2 100%   Breastfeeding No   BMI 24.80 kg/m²   Vitals:    05/21/24 1845 05/21/24 2124 05/21/24 2125 05/21/24 2127   BP: 123/72 110/66 113/76 115/80   Pulse: 86 88 90 105   Resp: 20      Temp: 98.2 °F (36.8 °C)      TempSrc: Oral      SpO2: 98% 100% 100% 100%   Weight: 63.5 kg (140 lb)      Height: 5' 3" (1.6 m)       05/21/24 2237   BP: (!) 95/59   Pulse: 84   Resp: 14   Temp: 98.8 °F (37.1 °C)   TempSrc: Oral   SpO2: 100%   Weight:    Height:        Nurse's notes vitals reviewed  Constitutional: Patient alert and oriented well-developed well-nourished  Eyes:  Normal conjunctiva.  Extraocular muscles are intact.  ENT: Oral mucosa moist  GI:  Nontender to palpation, bowel sounds normal, no distention or guarding is noted.  :  No CMT or adnexal tenderness, blood clots noted in the canal, unable to visualize the os.  Musculoskeletal: Normocephalic atraumatic, normal range of motion, no obvious deformities  Skin: Warm and dry no rashes or lesions, no ecchymosis, no erythema  Neuro: alert and oriented x3,  no focal neurological deficits.  Psych: Appropriate, conversant      Labs Reviewed   CBC W/ AUTO DIFFERENTIAL - Abnormal; Notable for the following components:       Result Value    RBC 3.70 (*)     Hemoglobin 10.8 (*)     Hematocrit 32.7 (*)     All other components within normal limits    Narrative:     Release to patient->Immediate   URINALYSIS, REFLEX TO URINE CULTURE - Abnormal; Notable for the following components:    Color, UA Brown (*)     Protein, UA Trace (*)     Occult Blood UA 3+ (*)     All other components within " normal limits    Narrative:     Specimen Source->Urine   URINALYSIS MICROSCOPIC - Abnormal; Notable for the following components:    RBC, UA >100 (*)     All other components within normal limits    Narrative:     Specimen Source->Urine   CBC W/ AUTO DIFFERENTIAL - Abnormal; Notable for the following components:    RBC 3.16 (*)     Hemoglobin 9.2 (*)     Hematocrit 27.9 (*)     RDW 14.6 (*)     All other components within normal limits   COMPREHENSIVE METABOLIC PANEL    Narrative:     Release to patient->Immediate   HCG, QUANTITATIVE    Narrative:     Release to patient->Immediate   HIV 1 / 2 ANTIBODY    Narrative:     Release to patient->Immediate   HEPATITIS C ANTIBODY    Narrative:     Release to patient->Immediate   POCT URINE PREGNANCY   GROUP & RH   TYPE & SCREEN       US OB <14 Wks TransAbd & TransVag, Single Gestation (XPD)   Final Result   Abnormal      An intrauterine pregnancy is not identified, heterogeneity along the endometrial canal noted with vascularity, this can be seen with retained products of conception, close clinical and historical correlation and follow-up serial beta HCG level and follow-up ultrasound is recommended.      This report was flagged in Epic as abnormal.      .         Electronically signed by: Amaury Cruz   Date:    05/21/2024   Time:    21:11            Initial Impression/ Differential Dx:  Differential Diagnosis includes, but is not limited to:  Pregnancy complication (threatened AB, inevitable AB, incomplete Ab, missed AB, ectopic pregnancy, placenta previa) normal menses, STD, foreign body, trauma.      MDM:    Medical Decision Making  33-year-old female with reported positive pregnancy test in April, reportedly began having heavy vaginal bleeding on May 7th and was passing clots with some abdominal cramping.  Patient assumed she was having a miscarriage and did not follow-up with the OBGYN.  She had also not had a confirmatory ultrasound of the pregnancy.  States  bleeding lasted a few days and she began having spotting until today when she began having heavy vaginal bleeding with clots again.  Labs revealed mild anemia with a hemoglobin of 10.8, on exam there was no abdominal tenderness.  Beta was elevated at 3200.  Ultrasound revealed no confirmed IUP but thickened endometrial canal.  I spoke with on-call OBGYN who recommended repeat beta in 48 hours as this is technically a pregnancy of unknown location.  Due to heavy vaginal bleeding I believe patient needs to be monitored overnight with q.4 hours CBC is, she will be admitted to the EDOU for repeat labs and possible OBGYN consult in the a.m. if bleeding is persistent.    Problems Addressed:  Vaginal bleeding in pregnancy: acute illness or injury    Amount and/or Complexity of Data Reviewed  Labs: ordered. Decision-making details documented in ED Course.  Radiology: ordered.    Risk  OTC drugs.  Prescription drug management.  Decision regarding hospitalization.        ED Course as of 05/21/24 2256   Tue May 21, 2024   1938 WBC: 7.37 [AS]   1938 Hemoglobin(!): 10.8 [AS]   1938 Hematocrit(!): 32.7 [AS]   1938 Platelet Count: 304 [AS]   1939 Blood, UA(!): 3+ [AS]   1939 RBC, UA(!): >100 [AS]   1949 Beta HCG Quant: 3221 [AS]   1949 Sodium: 136 [AS]   1949 Potassium: 3.8 [AS]   1949 BUN: 13 [AS]   1949 Creatinine: 0.7 [AS]   2224 ABO/Rh [AS]   2256 Patient is repeat CBC hemoglobin 9.2 from 10.8, she has been saturating pads every 45 minutes while in the emergency department.  Reported now having some lightheadedness.  OBGYN was paged will be consulted, states he will come evaluate the patient.  Blood consent was obtained. [AS]      ED Course User Index  [AS] Gisselle Ashley, VIVI       Diagnostic Impression:    1. Pregnancy, location unknown    2. Vaginal bleeding in pregnancy         ED Disposition Condition    Observation Stable                  Gisselle Ashley, VIVI  05/21/24 2146       Gisselle Ashley, VIVI  05/21/24 2236        Gisselle Ashley, [facility]  05/21/24 3284

## 2024-05-22 NOTE — TELEPHONE ENCOUNTER
----- Message from Hedy Oconnell MD sent at 5/22/2024  6:17 AM CDT -----  Regarding: Appointment Request  Hello All,     Can we scheduled this patient a postop appointment in 4 weeks. She had a suction D&C overnight for an incomplete AB.     Thank you,  Hedy Oconnell

## 2024-05-22 NOTE — PROGRESS NOTES
Reviewed discharge instructions with pt. Verified with MD that office will call to schedule next Dr. Jansen. Verbalized understanding. IV's d/c'ed. No distress noted.

## 2024-05-22 NOTE — ED NOTES
Pt provided with notification of parental rights form, pt decided not to place remains in nursing home, declined to fill out form.

## 2024-05-22 NOTE — ED TRIAGE NOTES
Pt states she had a miscarriage on the 7th of  this month, states has been bleeding lightly until this afternoon, when she suddenly saturated a jumbo tampon in 15 minutes, has been saturating pads every 45 minutes since then and passing golfball sized clots, reports feeling slightly lightheaded.

## 2024-05-22 NOTE — CONSULTS
[facility] - Emergency Dept  Obstetrics & Gynecology  Consult Note    Patient Name: Betina Jay  MRN: [id number]  Admission Date: 2024  Hospital Length of Stay: 0 days  Code Status: Full Code  Principal Problem: Vaginal bleeding    Inpatient consult to Obstetrics  Consult performed by: Catrina Gauthier MD  Consult ordered by: Catrina Gauthier MD        Please see H&P by me dated 24 for full consult note. In short, 33 y.o.  F presents to ED for vaginal bleeding. Positive home pregnancy test . Saturated 10+ pads today, endorses dizziness & lightheadedness. Beta HCG 3221, TVUS unable to identify IUP, shows thickened endometrial stripe. CBC initially H/H 10.8/32.7 and dropped to 9.2/27.9 over 3 hours. Although this is technically pregnancy of unknown location due to lack of imaging conforming IUP, clinical picture consistent with incomplete AB as patient has elevated beta HCG with dilated cervix and heavy vaginal bleeding. To OR for urgent suction D&C, consents for surgery & blood transfusion signed. House Sup notified. Given 1L LR for fluid resuscitation and 1u pRBC held.    Thank you for your consult. I will follow-up with patient. Please contact us if you have any additional questions.    Catrina Gauthier MD  Obstetrics & Gynecology  [facility] - Emergency Dept         [de-identified] : Constitutional: The patient appears well developed, well nourished. Examination of patients ability to communicate functionally was normal.   Neurologic: Coordination is normal. Alert and oriented to time, place and person. No evidence of mood disorder, calm affect.      LEFT   HIP/THIGH : Inspection of the hip/thigh is as follows: Inspection shows no swelling, no ecchymosis, no erythema, no rashes and no masses.   Palpation of the hip/thigh is as follows: groin tenderness. no palpable defects and no palpable masses.   Range of motion of the hip is as follows in degrees:   Flexion: 110   Abduction:  20   External rotation:  30  Internal rotaion:  20 PAIN ELICITED INTO THE GROIN WITH ROM HIP Stength testing of the hip/thigh is as follows: Hip flexion strength:   5/5  Hip extension strength:  5/5  Hip abductionstrength:  5/5 Hip adductionstrength:  5/5  Neurological testing of the hip/thigh is as follows: motor exam 5/5 throughout, light touch intact throughout and no focal motor defecits.   Gait and function is as follows: mildly antalgic gait.    [Left] : left hip with pelvis [FreeTextEntry9] : ap/lat show mod to severe hip DJD femoral head/neck subchondral cystic changes. no frx noted

## 2024-05-22 NOTE — H&P
[facility] Emergency Dept  Obstetrics & Gynecology  History & Physical    Patient Name: Betina Jay  MRN: [id number]  Admission Date: 2024  Primary Care Provider: Samira Sahu DNP    Subjective:     Chief Complaint/Reason for Admission: vaginal bleeding    History of Present Illness:   Betina Jay is a 33 y.o.  who presents for vaginal bleeding. She states she had a positive pregnancy test  and began having vaginal bleeding May 7th. She has not yet established care with an OBGYN during this pregnancy nor has she had an ultrasound prior to today. She reports the vaginal bleeding was initially intermittent and light as well as intermittent abdominal cramping. Earlier today, her bleeding became much heavier, she reports bleeding through a super tampon in 15 minutes this morning. She then switched to pads and she estimates she has fully saturated at least 10 pads today. She reports that when driving to the ER, she was sitting in a puddle of blood by the time she arrived to the hospital. She denies any current abdominal cramping but report dizziness & lightheadedness while laying down. She states the dizziness & lightheadedness are worse with standing and she endorses nausea when moving around. She denies chest pain, SOB, and heart palpitations.     No current facility-administered medications on file prior to encounter.     Current Outpatient Medications on File Prior to Encounter   Medication Sig    albuterol (PROVENTIL/VENTOLIN HFA) 90 mcg/actuation inhaler INHALE 2 PUFFS BY MOUTH EVERY 6 HOURS AS NEEDED FOR WHEEZING    etonogestreL-ethinyl estradioL (NUVARING) 0.12-0.015 mg/24 hr vaginal ring Place vaginally.    fluticasone propionate (FLONASE) 50 mcg/actuation nasal spray 1 spray (50 mcg total) by Each Nostril route once daily.    ketoconazole (NIZORAL) 2 % shampoo Apply topically twice a week.    levocetirizine (XYZAL) 5 MG tablet Take 1 tablet (5 mg total) by mouth every  evening.    sertraline (ZOLOFT) 50 MG tablet Take 1 tablet (50 mg total) by mouth once daily.    triamcinolone acetonide 0.5% (KENALOG) 0.5 % Crea Apply topically 2 (two) times daily.       Review of patient's allergies indicates:   Allergen Reactions    Penicillins Hives       Past Medical History:   Diagnosis Date    Anxiety     History of HPV infection     Other depression      OB History   No obstetric history on file.     Past Surgical History:   Procedure Laterality Date    CERVICAL BIOPSY  W/ LOOP ELECTRODE EXCISION       SECTION       Family History       Problem Relation (Age of Onset)    Heart disease Paternal Grandmother    Hyperlipidemia Father    Hypertension Mother          Tobacco Use    Smoking status: Never    Smokeless tobacco: Never   Substance and Sexual Activity    Alcohol use: Yes     Alcohol/week: 1.0 standard drink of alcohol     Types: 1 Glasses of wine per week     Comment: Social drinker    Drug use: Not Currently     Types: Marijuana     Comment: stopped smoking THC in     Sexual activity: Yes     Partners: Male     Birth control/protection: Inserts     Review of Systems   Constitutional:  Negative for chills and fever.   HENT:  Negative for nasal congestion and mouth sores.    Eyes:  Negative for visual disturbance.   Respiratory:  Negative for cough and shortness of breath.    Cardiovascular:  Negative for chest pain, palpitations and leg swelling.   Gastrointestinal:  Negative for abdominal pain, constipation, diarrhea, nausea and vomiting.   Genitourinary:  Positive for vaginal bleeding. Negative for dysuria, pelvic pain and vaginal discharge.   Musculoskeletal:  Negative for joint swelling.   Integumentary:  Negative for rash.   Neurological:  Positive for vertigo. Negative for syncope, numbness and headaches.   Psychiatric/Behavioral:  The patient is not nervous/anxious.      Objective:     Vital Signs (Most Recent):  Temp: 98.8 °F (37.1 °C) (24 2237)  Pulse: 87  (05/21/24 2249)  Resp: 14 (05/21/24 2237)  BP: 117/62 (05/21/24 2256)  SpO2: 100 % (05/21/24 2249) Vital Signs (24h Range):  Temp:  [98.2 °F (36.8 °C)-98.8 °F (37.1 °C)] 98.8 °F (37.1 °C)  Pulse:  [] 87  Resp:  [14-20] 14  SpO2:  [98 %-100 %] 100 %  BP: ()/(59-80) 117/62     Weight: 63.5 kg (140 lb)  Body mass index is 24.8 kg/m².  No LMP recorded.    Physical Exam:   Constitutional: She is oriented to person, place, and time. She appears well-developed and well-nourished. No distress.    HENT:   Head: Normocephalic and atraumatic.    Eyes: EOM are normal.   Conjunctivae pale     Cardiovascular:  Normal rate.             Pulmonary/Chest: Effort normal. No respiratory distress.        Abdominal: Soft. She exhibits no distension. There is no abdominal tenderness.     Genitourinary:    Genitourinary Comments: External genitalia: grossly normal, no erythema edema or lesions noted. Blood on external genitalia and pad.   SSE: abundant pooling of blood and clot in speculum. Attempted to clear vaginal vault with sponge stick and multiple proctoswabs, unable to visualize cervix.   SVE: 100cc of blood clot evacuated from vaginal vault. Cervix 1cm dilated.              Musculoskeletal: Moves all extremeties.       Neurological: She is alert and oriented to person, place, and time.    Skin: Skin is warm and dry. She is not diaphoretic. There is pallor.    Psychiatric: She has a normal mood and affect. Her behavior is normal. Thought content normal.       Laboratory:  Recent Labs   Lab 05/21/24 1916 05/21/24 2232   WBC 7.37 8.12   HGB 10.8* 9.2*   HCT 32.7* 27.9*   MCV 88 88    267       Recent Labs   Lab 05/21/24 1916      K 3.8      CO2 24   BUN 13   CREATININE 0.7      PROT 7.2   BILITOT 0.3   ALKPHOS 57   ALT 13   AST 14       Beta HCG 3221    Diagnostic Results:  Imaging Results               US OB <14 Wks TransAbd & TransVag, Single Gestation (XPD) (Final result)  Result time  05/21/24 21:11:10      Final result by Amaury Cruz MD (05/21/24 21:11:10)                   Impression:      An intrauterine pregnancy is not identified, heterogeneity along the endometrial canal noted with vascularity, this can be seen with retained products of conception, close clinical and historical correlation and follow-up serial beta HCG level and follow-up ultrasound is recommended.    This report was flagged in Epic as abnormal.    .      Electronically signed by: Amaury Cruz  Date:    05/21/2024  Time:    21:11               Narrative:    EXAMINATION:  US OB <14 WEEKS, TRANSABDOM & TRANSVAG, SINGLE GESTATION (XPD)    CLINICAL HISTORY:  Vag Bleeding;    TECHNIQUE:  Transabdominal sonography of the pelvis was performed, followed by transvaginal sonography to better evaluate the uterus and ovaries.    COMPARISON:  None.    FINDINGS:  Hypoechoic foci of the cervix may represent nabothian cysts.  The uterus measures approximately 7.8 cm in length.  A normal appearing intrauterine pregnancy is not identified, there is thickened heterogeneous appearance along the endometrium measuring up to approximately 1.6 cm.  There is vascularity noted on color Doppler imaging, this can be seen with retained products of conception.    The left ovary measures approximately 1.9 by 1.5 x 2.2 cm in size, demonstrates appropriate flow on color Doppler imaging.  The right ovary measures approximately 3.8 x 2.1 by 3.2 cm in size, demonstrates appropriate flow on color Doppler imaging.                                     Assessment/Plan:     Active Diagnoses:    Diagnosis Date Noted POA    PRINCIPAL PROBLEM:  Vaginal bleeding [N93.9] 05/22/2024 Yes      Problems Resolved During this Admission:     1) PUL/suspected incomplete AB   - VSS, patient initially normotensive, became hypotensive to 95/59 over time in ED   - PE as above: SSE: abundant pooling of blood and clot in speculum. Attempted to clear vaginal vault with  sponge stick and multiple proctoswabs, unable to visualize cervix. SVE: 100cc of blood clot and possible POC evacuated from vaginal vault. Cervix 1cm dilated.   - CBC initially H/H 10.8/32.7 and dropped to 9.2/27.9 over 3 hours  - CMP WNL  - Beta HCG  3221  - T&S A pos; Rhogam not indicated  - TVUS: an intrauterine pregnancy is not identified, heterogeneity along the endometrial canal noted with vascularity, this can be seen with retained products of conception  - Blood clot & possible POC obtained during ED exam sent to pathology   - Although this is technically pregnancy of unknown location due to lack of imaging conforming IUP, clinical picture consistent with incomplete AB as patient has elevated beta HCG with dilated cervix and heavy vaginal bleeding   - To OR for urgent suction D&C, consents for surgery & blood transfusion signed  - Given 1L LR for fluid resuscitation  - 1u pRBC held  - Strict pad counts prior to OR  - PreOp doxy ordered  - House Sup notified to call OR team  - Plan for repeat CBC post op       Catrina Gauthier MD  Obstetrics & Gynecology  [facility] - Emergency Dept

## 2024-05-22 NOTE — ANESTHESIA PROCEDURE NOTES
Intubation    Date/Time: 5/22/2024 1:18 AM    Performed by: Joseluis Bañuelos CRNA  Authorized by: Brayan Sheppard MD    Intubation:     Induction:  Intravenous    Intubated:  Postinduction    Mask Ventilation:  Easy mask    Attempts:  1    Attempted By:  CRNA    Difficult Airway Encountered?: No      Complications:  None    Airway Device:  Supraglottic airway/LMA    Airway Device Size:  4.0    Secured at:  The lips    Placement Verified By:  Capnometry    Complicating Factors:  None    Findings Post-Intubation:  BS equal bilateral and atraumatic/condition of teeth unchanged

## 2024-05-22 NOTE — DISCHARGE INSTRUCTIONS
Reviewed discharge instructions with pt. Verified with MD that the office will call her to set up new Dr. Lorena FRASER's d/c'ed. No distress noted.

## 2024-05-22 NOTE — PROGRESS NOTES
[facility]  Obstetrics & Gynecology  Progress Note    Patient Name: Betina Jay  MRN: [id number]  Admission Date: 2024  Primary Care Provider: Samira Sahu DNP  Principal Problem: Vaginal bleeding    Subjective:     Chief Complaint/Reason for Admission: vaginal bleeding     History of Present Illness:   Betina Jay is a 33 y.o.  who presents for vaginal bleeding. She states she had a positive pregnancy test  and began having vaginal bleeding May 7th. She has not yet established care with an OBGYN during this pregnancy nor has she had an ultrasound prior to today. She reports the vaginal bleeding was initially intermittent and light as well as intermittent abdominal cramping. Earlier today, her bleeding became much heavier, she reports bleeding through a super tampon in 15 minutes this morning. She then switched to pads and she estimates she has fully saturated at least 10 pads today. She reports that when driving to the ER, she was sitting in a puddle of blood by the time she arrived to the hospital. She denies any current abdominal cramping but report dizziness & lightheadedness while laying down. She states the dizziness & lightheadedness are worse with standing and she endorses nausea when moving around. She denies chest pain, SOB, and heart palpitations.     Interval History:   Patient is POD0 from a suction D&C for an incomplete AM. She is doing well this AM. She states that her bleeding is scant at this time. She denies any lightheadedness, dizziness, fevers, or chills. She has been ambulating and urinating without difficulty. She is tolerating PO without nausea or vomiting. She is reporting minimal pain at this time. Her last dose of pain medications was at 0200 this AM in PACU. She last received oxy IR 5mg at 0214 this AM.    Scheduled Meds:   diphenhydrAMINE         Continuous Infusions:  PRN Meds:  Current Facility-Administered Medications:      0.9%  NaCl infusion (for blood administration), , Intravenous, Q24H PRN    diphenhydrAMINE, , ,     famotidine, 20 mg, Oral, On Call Procedure    melatonin, 6 mg, Oral, Nightly PRN    ondansetron, 8 mg, Oral, Q8H PRN    sodium chloride 0.9%, 10 mL, Intravenous, PRN    sodium chloride 0.9%, 10 mL, Intravenous, PRN    Review of patient's allergies indicates:   Allergen Reactions    Penicillins Hives       Objective:     Vital Signs (Most Recent):  Temp: 98.3 °F (36.8 °C) (05/22/24 0305)  Pulse: 90 (05/22/24 0400)  Resp: (!) 23 (05/22/24 0400)  BP: 107/71 (05/22/24 0400)  SpO2: 95 % (05/22/24 0400) Vital Signs (24h Range):  Temp:  [98 °F (36.7 °C)-98.8 °F (37.1 °C)] 98.3 °F (36.8 °C)  Pulse:  [] 90  Resp:  [14-38] 23  SpO2:  [95 %-100 %] 95 %  BP: ()/(59-80) 107/71     Weight: 63.5 kg (140 lb)  Body mass index is 24.8 kg/m².  No LMP recorded.    I&O (Last 24H):    Intake/Output Summary (Last 24 hours) at 5/22/2024 0606  Last data filed at 5/22/2024 0411  Gross per 24 hour   Intake 1450 ml   Output 530 ml   Net 920 ml       Physical Exam:   Constitutional: She is oriented to person, place, and time. She appears well-developed and well-nourished.    HENT:   Head: Normocephalic and atraumatic.      Cardiovascular:  Normal rate.             Pulmonary/Chest: No respiratory distress.        Abdominal: Soft. There is no abdominal tenderness. There is no rebound and no guarding.             Musculoskeletal: Normal range of motion.       Neurological: She is alert and oriented to person, place, and time.    Skin: Skin is warm and dry.    Psychiatric: She has a normal mood and affect. Her behavior is normal. Thought content normal.       Laboratory:  Recent Labs   Lab 05/21/24  2232 05/22/24  0133 05/22/24  0529   WBC 8.12 8.56 14.67*   HGB 9.2* 8.4* 10.6*   HCT 27.9* 25.2* 31.7*   MCV 88 88 87    246 231      Recent Labs   Lab 05/21/24 1916      K 3.8      CO2 24   BUN 13   CREATININE 0.7       PROT 7.2   BILITOT 0.3   ALKPHOS 57   ALT 13   AST 14      Beta hC    Diagnostic Results:  Results for orders placed or performed during the hospital encounter of 24   US OB <14 Wks TransAbd & TransVag, Single Gestation (XPD)    Narrative    EXAMINATION:  US OB <14 WEEKS, TRANSABDOM & TRANSVAG, SINGLE GESTATION (XPD)    CLINICAL HISTORY:  Vag Bleeding;    TECHNIQUE:  Transabdominal sonography of the pelvis was performed, followed by transvaginal sonography to better evaluate the uterus and ovaries.    COMPARISON:  None.    FINDINGS:  Hypoechoic foci of the cervix may represent nabothian cysts.  The uterus measures approximately 7.8 cm in length.  A normal appearing intrauterine pregnancy is not identified, there is thickened heterogeneous appearance along the endometrium measuring up to approximately 1.6 cm.  There is vascularity noted on color Doppler imaging, this can be seen with retained products of conception.    The left ovary measures approximately 1.9 by 1.5 x 2.2 cm in size, demonstrates appropriate flow on color Doppler imaging.  The right ovary measures approximately 3.8 x 2.1 by 3.2 cm in size, demonstrates appropriate flow on color Doppler imaging.      Impression    An intrauterine pregnancy is not identified, heterogeneity along the endometrial canal noted with vascularity, this can be seen with retained products of conception, close clinical and historical correlation and follow-up serial beta HCG level and follow-up ultrasound is recommended.    This report was flagged in Epic as abnormal.    .      Electronically signed by: Amaury Cruz  Date:    2024  Time:    21:11         Assessment/Plan:     Active Diagnoses:    Diagnosis Date Noted POA    PRINCIPAL PROBLEM:  Vaginal bleeding [N93.9] 2024 Yes      Problems Resolved During this Admission:     Patient is POD#0 from a suction D&C for the treatment of an incomplete AB resulting in significant vaginal  bleeding  - VSS, afebrile, non-tachycardic  - Scant vaginal bleeding  - Abdomen non-tender  - H/H trend: 10.8/32.7> 9.2/27.9> 8.4/25.2> 1u pRBCs (intra-op) > 10.6/31.7 (this AM)  - Total blood products: 1u pRBCs  - Patient is doing well this AM - meeting all postop milestones and is feeling much improved  - Will plan for discharge home this AM  - Will arrange for postop follow up at 4 weeks in the GYN resident clinic  - Patient would like to establish routine OBGYN care with Dr. Lizarraga  - Will message both clinics for follow up appointments    Hedy Oconnell MD  Obstetrics & Gynecology  [facility] - [facility] ([place])

## 2024-05-22 NOTE — TRANSFER OF CARE
"Anesthesia Transfer of Care Note    Patient: Betina Jay    Procedure(s) Performed: Procedure(s) (LRB):  DILATION AND CURETTAGE, UTERUS, USING SUCTION (N/A)    Patient location: PACU    Anesthesia Type: general    Transport from OR: Transported from OR on room air with adequate spontaneous ventilation    Post pain: adequate analgesia    Post assessment: no apparent anesthetic complications and tolerated procedure well    Post vital signs: stable    Level of consciousness: awake and responds to stimulation    Nausea/Vomiting: no nausea/vomiting    Complications: none    Transfer of care protocol was followed      Last vitals: Visit Vitals  BP (!) 152/66 (BP Location: Right arm, Patient Position: Lying)   Pulse 93   Temp 36.7 °C (98 °F) (Temporal)   Resp 16   Ht 5' 3" (1.6 m)   Wt 63.5 kg (140 lb)   SpO2 97%   Breastfeeding No   BMI 24.80 kg/m²     "

## 2024-05-22 NOTE — ANESTHESIA POSTPROCEDURE EVALUATION
Anesthesia Post Evaluation    Patient: Betina Jay    Procedure(s) Performed: Procedure(s) (LRB):  DILATION AND CURETTAGE, UTERUS, USING SUCTION (N/A)    Final Anesthesia Type: general      Patient location during evaluation: PACU  Patient participation: Yes- Able to Participate  Level of consciousness: awake and alert  Post-procedure vital signs: reviewed and stable  Pain management: adequate  Airway patency: patent    PONV status at discharge: No PONV  Anesthetic complications: no      Cardiovascular status: blood pressure returned to baseline  Respiratory status: unassisted  Hydration status: euvolemic  Follow-up not needed.              Vitals Value Taken Time   /62 05/22/24 0201   Temp 36.3   05/22/24 0209   Pulse 80 05/22/24 0208   Resp 16 05/22/24 0200   SpO2 97 % 05/22/24 0208   Vitals shown include unfiled device data.      No case tracking events are documented in the log.      Pain/Naga Score: Pain Rating Prior to Med Admin: 4 (5/21/2024 10:31 PM)  Pain Rating Post Med Admin: 0 (5/21/2024 11:31 PM)  Naga Score: 9 (5/22/2024  1:56 AM)

## 2024-05-22 NOTE — NURSING
Nurses Note -- 4 Eyes      5/22/2024   3:16 AM      Skin assessed during: Admit      [x] No Altered Skin Integrity Present    []Prevention Measures Documented      [] Yes- Altered Skin Integrity Present or Discovered   [] LDA Added if Not in Epic (Describe Wound)   [] New Altered Skin Integrity was Present on Admit and Documented in LDA   [] Wound Image Taken    Wound Care Consulted? No    Attending Nurse:  Mansoor Gibbons RN/Staff Member:  Snow Davis RN

## 2024-05-23 VITALS
RESPIRATION RATE: 11 BRPM | BODY MASS INDEX: 24.8 KG/M2 | WEIGHT: 140 LBS | HEART RATE: 70 BPM | OXYGEN SATURATION: 98 % | SYSTOLIC BLOOD PRESSURE: 96 MMHG | TEMPERATURE: 98 F | DIASTOLIC BLOOD PRESSURE: 67 MMHG | HEIGHT: 63 IN

## 2024-05-28 ENCOUNTER — PATIENT OUTREACH (OUTPATIENT)
Dept: ADMINISTRATIVE | Facility: CLINIC | Age: 34
End: 2024-05-28
Payer: COMMERCIAL

## 2024-05-29 LAB
FINAL PATHOLOGIC DIAGNOSIS: NORMAL
FINAL PATHOLOGIC DIAGNOSIS: NORMAL
GROSS: NORMAL
GROSS: NORMAL
Lab: NORMAL
Lab: NORMAL

## 2024-05-30 ENCOUNTER — OFFICE VISIT (OUTPATIENT)
Dept: OBSTETRICS AND GYNECOLOGY | Facility: CLINIC | Age: 34
End: 2024-05-30
Payer: COMMERCIAL

## 2024-05-30 VITALS
WEIGHT: 152 LBS | DIASTOLIC BLOOD PRESSURE: 80 MMHG | SYSTOLIC BLOOD PRESSURE: 110 MMHG | HEIGHT: 63 IN | BODY MASS INDEX: 26.93 KG/M2

## 2024-05-30 DIAGNOSIS — Z30.09 BIRTH CONTROL COUNSELING: ICD-10-CM

## 2024-05-30 DIAGNOSIS — Z48.89 POSTOPERATIVE VISIT: Primary | ICD-10-CM

## 2024-05-30 PROCEDURE — 1160F RVW MEDS BY RX/DR IN RCRD: CPT | Mod: CPTII,S$GLB,, | Performed by: OBSTETRICS & GYNECOLOGY

## 2024-05-30 PROCEDURE — 3008F BODY MASS INDEX DOCD: CPT | Mod: CPTII,S$GLB,, | Performed by: OBSTETRICS & GYNECOLOGY

## 2024-05-30 PROCEDURE — 3074F SYST BP LT 130 MM HG: CPT | Mod: CPTII,S$GLB,, | Performed by: OBSTETRICS & GYNECOLOGY

## 2024-05-30 PROCEDURE — 1159F MED LIST DOCD IN RCRD: CPT | Mod: CPTII,S$GLB,, | Performed by: OBSTETRICS & GYNECOLOGY

## 2024-05-30 PROCEDURE — 99213 OFFICE O/P EST LOW 20 MIN: CPT | Mod: S$GLB,,, | Performed by: OBSTETRICS & GYNECOLOGY

## 2024-05-30 PROCEDURE — 3079F DIAST BP 80-89 MM HG: CPT | Mod: CPTII,S$GLB,, | Performed by: OBSTETRICS & GYNECOLOGY

## 2024-05-30 PROCEDURE — 99999 PR PBB SHADOW E&M-EST. PATIENT-LVL III: CPT | Mod: PBBFAC,,,

## 2024-05-30 RX ORDER — ETONOGESTREL AND ETHINYL ESTRADIOL VAGINAL RING .015; .12 MG/D; MG/D
1 RING VAGINAL
Qty: 3 EACH | Refills: 3 | Status: SHIPPED | OUTPATIENT
Start: 2024-05-30

## 2024-05-30 NOTE — PROGRESS NOTES
Betina Jay is a 33 y.o. female, postop 1 weeks s/p suction D&C for incomplete ab who presents for routine follow up. She has no complaints today. She denies any pain, VB, purulent VD, n/v/d/c. Does not desire more children at this time, interested in birth control.     Pathology:     UTERUS, SUCTION DILATATION AND CURETTAGE:  -  Products of conception consisting of chorionic villi with extensive necrosis and degenerative changes, fragments of decidua with necrosis, acute inflammation, and hemorrhage, and fragments of gestational endometrium with marked chronic endometritis;  intermixed with abundant blood clot.    -  Fragments of endocervix with chronic cervicitis and focal acute cervicitis.  -  No fetal tissue is identified.  -  No evidence of atypia or malignancy.       ROS: No Abd/vaginal pain, VB, VD, fevers, chills, nausea, vomiting, diarrhea    PE:  Vitals:    05/30/24 1526   BP: 110/80     Gen: NAD  Resp: nl effort  Abd: Soft, NT, Incision: soft and dry, no erythema or edema, no drainage  Pelvic: SSE: cervix well healed, physiologic VD noted, no purulent drainage, no VB  Skin: warm and dry  Psych: nl affect  Neuro: Cns intact    A/P:  1) Routine Postop visit s/p suction D&C  -Patient doing well, follow up PRN. Precautions given. Following up in July for annual.     2) Birth control counseling  We Discussed in depth the types of birth control available including OCPs, Birth control Patch, Nuva Ring, Depo Provera, Nexplanon, Diaphragm, cervical Cap, condoms, Natural family planning, and the different types of IUDs. We thoroughly reviewed the risks and benefits of all options and all questions were answered.     The patient has opted to proceed with nuvaring for her birth control. Rx sent. No CI. Also thinking about IUD. Briefly discussed mirena vs copper. She will let us know if she wants this so we can order.     Patient was counseled today on ACS guidelines and recommendations for yearly pelvic exams,  mammograms and monthly self breast exams; to see her PCP for other health maintenance.

## 2024-07-11 ENCOUNTER — OFFICE VISIT (OUTPATIENT)
Dept: OBSTETRICS AND GYNECOLOGY | Facility: CLINIC | Age: 34
End: 2024-07-11
Payer: COMMERCIAL

## 2024-07-11 VITALS
DIASTOLIC BLOOD PRESSURE: 70 MMHG | SYSTOLIC BLOOD PRESSURE: 100 MMHG | HEIGHT: 63 IN | WEIGHT: 144.63 LBS | BODY MASS INDEX: 25.62 KG/M2

## 2024-07-11 DIAGNOSIS — Z12.39 BREAST CANCER SCREENING OTHER THAN MAMMOGRAM: ICD-10-CM

## 2024-07-11 DIAGNOSIS — Z98.890 HISTORY OF LOOP ELECTRICAL EXCISION PROCEDURE (LEEP): ICD-10-CM

## 2024-07-11 DIAGNOSIS — Z30.44 ENCOUNTER FOR SURVEILLANCE OF VAGINAL RING HORMONAL CONTRACEPTIVE DEVICE: ICD-10-CM

## 2024-07-11 DIAGNOSIS — Z01.419 ENCOUNTER FOR ANNUAL ROUTINE GYNECOLOGICAL EXAMINATION: Primary | ICD-10-CM

## 2024-07-11 DIAGNOSIS — Z12.4 CERVICAL CANCER SCREENING: ICD-10-CM

## 2024-07-11 DIAGNOSIS — R35.0 URINARY FREQUENCY: ICD-10-CM

## 2024-07-11 DIAGNOSIS — Z71.85 HPV VACCINE COUNSELING: ICD-10-CM

## 2024-07-11 LAB
BILIRUB UR QL STRIP: NEGATIVE
CLARITY UR REFRACT.AUTO: CLEAR
COLOR UR AUTO: YELLOW
GLUCOSE UR QL STRIP: NEGATIVE
HGB UR QL STRIP: NEGATIVE
KETONES UR QL STRIP: NEGATIVE
LEUKOCYTE ESTERASE UR QL STRIP: NEGATIVE
NITRITE UR QL STRIP: NEGATIVE
PH UR STRIP: 7 [PH] (ref 5–8)
PROT UR QL STRIP: NEGATIVE
SP GR UR STRIP: 1.02 (ref 1–1.03)
URN SPEC COLLECT METH UR: NORMAL

## 2024-07-11 PROCEDURE — 3078F DIAST BP <80 MM HG: CPT | Mod: CPTII,S$GLB,, | Performed by: OBSTETRICS & GYNECOLOGY

## 2024-07-11 PROCEDURE — 3074F SYST BP LT 130 MM HG: CPT | Mod: CPTII,S$GLB,, | Performed by: OBSTETRICS & GYNECOLOGY

## 2024-07-11 PROCEDURE — 81003 URINALYSIS AUTO W/O SCOPE: CPT | Performed by: OBSTETRICS & GYNECOLOGY

## 2024-07-11 PROCEDURE — 1159F MED LIST DOCD IN RCRD: CPT | Mod: CPTII,S$GLB,, | Performed by: OBSTETRICS & GYNECOLOGY

## 2024-07-11 PROCEDURE — 1160F RVW MEDS BY RX/DR IN RCRD: CPT | Mod: CPTII,S$GLB,, | Performed by: OBSTETRICS & GYNECOLOGY

## 2024-07-11 PROCEDURE — 99395 PREV VISIT EST AGE 18-39: CPT | Mod: S$GLB,,, | Performed by: OBSTETRICS & GYNECOLOGY

## 2024-07-11 PROCEDURE — 3008F BODY MASS INDEX DOCD: CPT | Mod: CPTII,S$GLB,, | Performed by: OBSTETRICS & GYNECOLOGY

## 2024-07-11 PROCEDURE — 99999 PR PBB SHADOW E&M-EST. PATIENT-LVL III: CPT | Mod: PBBFAC,,, | Performed by: OBSTETRICS & GYNECOLOGY

## 2024-07-11 RX ORDER — BENZOYL PEROXIDE 100 MG/ML
1 LIQUID TOPICAL EVERY MORNING
COMMUNITY
Start: 2024-03-11

## 2024-07-11 RX ORDER — SULFAMETHOXAZOLE AND TRIMETHOPRIM 800; 160 MG/1; MG/1
2 TABLET ORAL DAILY
Qty: 6 TABLET | Refills: 0 | Status: SHIPPED | OUTPATIENT
Start: 2024-07-11 | End: 2024-07-14

## 2024-07-11 RX ORDER — CLOBETASOL PROPIONATE 0.46 MG/ML
SOLUTION TOPICAL
COMMUNITY
Start: 2024-03-11

## 2024-07-11 RX ORDER — ETONOGESTREL AND ETHINYL ESTRADIOL VAGINAL RING .015; .12 MG/D; MG/D
1 RING VAGINAL
Qty: 3 EACH | Refills: 3 | Status: SHIPPED | OUTPATIENT
Start: 2024-07-11

## 2024-07-11 RX ORDER — CLINDAMYCIN PHOSPHATE 11.9 MG/ML
SOLUTION TOPICAL EVERY MORNING
COMMUNITY
Start: 2024-03-11

## 2024-07-11 NOTE — PATIENT INSTRUCTIONS
Please check out the [employer] College of [employer] PATIENT WEBSITE.  The site has education materials, patient stories, expert views, and a portal for you to ask questions.       [email or web address]      As always, please let me know if you have any questions.     Dr. Lelo Lizarraga

## 2024-07-11 NOTE — PROGRESS NOTES
Chief Complaint: Well Woman Exam     HPI:      Betina Jay is a 33 y.o.  who presents for annual exam. She is currently complaining of urinary symptoms of urinary frequency.    Ms. Jay is currently sexually active with a single male partner. She declines STD screening today. Patient has regular monthly menses. Patient's last menstrual period was 2024 (exact date). She is currently using NuvaRing vaginal inserts for contraception.    Previous Pap:  no abnormalities ()  Previous Mammogram:  No results found for this or any previous visit.     Most Recent Dexa: not indicated  Colonoscopy: never had    COVID vaccine: completed series  Gardasil:Has never had     Patient Active Problem List   Diagnosis    Muscle weakness    Lack of coordination    LUNA (stress urinary incontinence, female)    Seborrheic dermatitis    Non-seasonal allergic rhinitis    Chronic cough    ASCUS with positive high risk HPV    Dysplasia of cervix, low grade (NUNO 1)    Vaginal bleeding    S/P Suction D&C - 24       Past Medical History:   Diagnosis Date    Anxiety     History of HPV infection     Other depression        Past Surgical History:   Procedure Laterality Date    CERVICAL BIOPSY  W/ LOOP ELECTRODE EXCISION       SECTION      DILATION AND CURETTAGE OF UTERUS USING SUCTION N/A 2024    Procedure: DILATION AND CURETTAGE, UTERUS, USING SUCTION;  Surgeon: Lelo Lizarraga MD;  Location: [facility];  Service: OB/GYN;  Laterality: N/A;       OB History          5    Para   2    Term   2            AB   3    Living   2         SAB   1    IAB   2    Ectopic        Multiple        Live Births   2           Obstetric Comments   13/R/3-5  Hx LEEP   Hx STD: chlamydia   x 1, CD x 1, SAB x 1, EAB x 2                ROS:     Review of Systems   Constitutional:  Negative for activity change, appetite change and fatigue.   Respiratory:  Negative for shortness of breath.    Cardiovascular:   "Negative for chest pain.   Gastrointestinal:  Negative for abdominal pain, constipation and diarrhea.   Endocrine: Negative for cold intolerance and heat intolerance.   Genitourinary:  Positive for frequency. Negative for dysuria, menstrual irregularity, pelvic pain and vaginal discharge.   Integumentary:  Negative for breast mass, breast discharge and breast tenderness.   Psychiatric/Behavioral:  Negative for dysphoric mood. The patient is not nervous/anxious.    Breast: Negative for mass and tenderness      Physical Exam:      PHYSICAL EXAM:  /70   Ht 5' 3" (1.6 m)   Wt 65.6 kg (144 lb 10 oz)   LMP 06/29/2024 (Exact Date)   BMI 25.62 kg/m²   Body mass index is 25.62 kg/m².     APPEARANCE: Well nourished, well developed, in no acute distress.  PSYCH: Appropriate mood and affect.  SKIN: No acne or hirsutism  NECK: Neck symmetric without masses or thyromegaly  NODES: No inguinal, axillary, or supraclavicular lymph node enlargement  CHEST: Normal respiratory effort.  ABDOMEN: Soft.  No tenderness or masses.   BREASTS: Symmetrical, no skin changes or visible lesions.  No palpable masses or nipple discharge bilaterally.  PELVIC: Normal external genitalia without lesions.  Normal hair distribution.  Adequate perineal body, normal urethral meatus.  Vagina moist and well rugated without lesions or discharge.  Cervix pink, without lesions, discharge or tenderness.  No significant cystocele or rectocele.  Bimanual exam shows uterus to be normal size, regular, mobile and nontender.  Adnexa without masses or tenderness.    EXTREMITIES: No edema.    Assessment:     1. Encounter for annual routine gynecological examination        2. Encounter for surveillance of vaginal ring hormonal contraceptive device  etonogestreL-ethinyl estradioL (NUVARING) 0.12-0.015 mg/24 hr vaginal ring      3. Breast cancer screening other than mammogram        4. Urinary frequency  Urinalysis, Reflex to Urine Culture Urine, Clean Catch    "   5. History of loop electrical excision procedure (LEEP)        6. Cervical cancer screening  Urinalysis, Reflex to Urine Culture Urine, Clean Catch    Liquid-Based Pap Smear, Screening    HPV High Risk Genotypes, PCR      7. HPV vaccine counseling  hpv vaccine,9-shaheed (GARDASIL 9, PF,) 0.5 mL Syrg            Plan:     Clinical breast exam performed.  Pap collected. Continue annually x 3 after LEEP.   Mammogram at 40 or as needed.  DEXA at 65.  Colonoscopy at 45 or as needed.  Contraception: continue Nuva Ring.  Follow up in about 1 year (around 7/11/2025) for annual well woman exam or as needed.    Counseling:     Patient was counseled today on current ASCCP pap guidelines, the recommendation for yearly pelvic exams, healthy diet and exercise routines, breast self awareness. She is to see her PCP for other health maintenance.       Use of the [employer] Patient Portal discussed and encouraged during today's visit.         Lelo Lizarraga MD  [facility] - Obstetrics and Gynecology  07/11/2024

## (undated) DEVICE — SOL IRR SOD CHL .9% POUR

## (undated) DEVICE — UNDERGLOVES BIOGEL PI SIZE 7.5

## (undated) DEVICE — GLOVE BIOGEL SKINSENSE PI 7.0

## (undated) DEVICE — HANDLE CURETTE W/TUBING

## (undated) DEVICE — SOL POVIDONE SCRUB IODINE 4 OZ

## (undated) DEVICE — Device

## (undated) DEVICE — VACURETTE 8MM CURVED

## (undated) DEVICE — SOL POVIDONE PREP IODINE 4 OZ